# Patient Record
Sex: MALE | Race: BLACK OR AFRICAN AMERICAN | Employment: FULL TIME | URBAN - METROPOLITAN AREA
[De-identification: names, ages, dates, MRNs, and addresses within clinical notes are randomized per-mention and may not be internally consistent; named-entity substitution may affect disease eponyms.]

---

## 2020-09-21 ENCOUNTER — APPOINTMENT (OUTPATIENT)
Dept: GENERAL RADIOLOGY | Age: 60
DRG: 177 | End: 2020-09-21
Attending: EMERGENCY MEDICINE
Payer: COMMERCIAL

## 2020-09-21 ENCOUNTER — HOSPITAL ENCOUNTER (INPATIENT)
Age: 60
LOS: 3 days | Discharge: HOME | DRG: 177 | End: 2020-09-24
Attending: EMERGENCY MEDICINE | Admitting: HOSPITALIST
Payer: COMMERCIAL

## 2020-09-21 DIAGNOSIS — U07.1 COVID-19: ICD-10-CM

## 2020-09-21 DIAGNOSIS — E11.9 CONTROLLED TYPE 2 DIABETES MELLITUS WITHOUT COMPLICATION, WITHOUT LONG-TERM CURRENT USE OF INSULIN (HCC): ICD-10-CM

## 2020-09-21 DIAGNOSIS — R09.02 HYPOXIA: Primary | ICD-10-CM

## 2020-09-21 DIAGNOSIS — U07.1 COVID-19 VIRUS INFECTION: ICD-10-CM

## 2020-09-21 DIAGNOSIS — J96.01 ACUTE RESPIRATORY FAILURE WITH HYPOXIA (HCC): ICD-10-CM

## 2020-09-21 DIAGNOSIS — I10 ESSENTIAL HYPERTENSION: ICD-10-CM

## 2020-09-21 LAB
ALBUMIN SERPL-MCNC: 2.5 G/DL (ref 3.5–5)
ALBUMIN/GLOB SERPL: 0.4 {RATIO} (ref 1.1–2.2)
ALP SERPL-CCNC: 71 U/L (ref 45–117)
ALT SERPL-CCNC: 25 U/L (ref 12–78)
ANION GAP SERPL CALC-SCNC: 6 MMOL/L (ref 5–15)
APPEARANCE UR: CLEAR
APTT PPP: 25.7 SEC (ref 22.1–32)
AST SERPL-CCNC: 25 U/L (ref 15–37)
ATRIAL RATE: 91 BPM
BACTERIA URNS QL MICRO: NEGATIVE /HPF
BASOPHILS # BLD: 0.1 K/UL (ref 0–0.1)
BASOPHILS NFR BLD: 0 % (ref 0–1)
BILIRUB SERPL-MCNC: 0.4 MG/DL (ref 0.2–1)
BILIRUB UR QL: NEGATIVE
BNP SERPL-MCNC: 46 PG/ML
BUN SERPL-MCNC: 14 MG/DL (ref 6–20)
BUN/CREAT SERPL: 13 (ref 12–20)
CALCIUM SERPL-MCNC: 8.9 MG/DL (ref 8.5–10.1)
CALCULATED P AXIS, ECG09: 41 DEGREES
CALCULATED R AXIS, ECG10: -7 DEGREES
CALCULATED T AXIS, ECG11: 27 DEGREES
CHLORIDE SERPL-SCNC: 100 MMOL/L (ref 97–108)
CO2 SERPL-SCNC: 30 MMOL/L (ref 21–32)
COLOR UR: ABNORMAL
COMMENT, HOLDF: NORMAL
COVID-19 RAPID TEST, COVR: DETECTED
CREAT SERPL-MCNC: 1.06 MG/DL (ref 0.7–1.3)
CRP SERPL-MCNC: 5.4 MG/DL (ref 0–0.6)
D DIMER PPP FEU-MCNC: 9.78 MG/L FEU (ref 0–0.65)
DIAGNOSIS, 93000: NORMAL
DIFFERENTIAL METHOD BLD: ABNORMAL
EOSINOPHIL # BLD: 0.1 K/UL (ref 0–0.4)
EOSINOPHIL NFR BLD: 1 % (ref 0–7)
EPITH CASTS URNS QL MICRO: ABNORMAL /LPF
ERYTHROCYTE [DISTWIDTH] IN BLOOD BY AUTOMATED COUNT: 16.5 % (ref 11.5–14.5)
EST. AVERAGE GLUCOSE BLD GHB EST-MCNC: 295 MG/DL
FIBRINOGEN PPP-MCNC: 708 MG/DL (ref 200–475)
GLOBULIN SER CALC-MCNC: 6.1 G/DL (ref 2–4)
GLUCOSE BLD STRIP.AUTO-MCNC: 222 MG/DL (ref 65–100)
GLUCOSE BLD STRIP.AUTO-MCNC: 372 MG/DL (ref 65–100)
GLUCOSE SERPL-MCNC: 324 MG/DL (ref 65–100)
GLUCOSE UR STRIP.AUTO-MCNC: >1000 MG/DL
HBA1C MFR BLD: 11.9 % (ref 4–5.6)
HCT VFR BLD AUTO: 41.7 % (ref 36.6–50.3)
HEALTH STATUS, XMCV2T: ABNORMAL
HGB BLD-MCNC: 13.3 G/DL (ref 12.1–17)
HGB UR QL STRIP: NEGATIVE
HYALINE CASTS URNS QL MICRO: ABNORMAL /LPF (ref 0–5)
IMM GRANULOCYTES # BLD AUTO: 0.1 K/UL (ref 0–0.04)
IMM GRANULOCYTES NFR BLD AUTO: 1 % (ref 0–0.5)
INR PPP: 1.1 (ref 0.9–1.1)
KETONES UR QL STRIP.AUTO: NEGATIVE MG/DL
LACTATE SERPL-SCNC: 1.4 MMOL/L (ref 0.4–2)
LDH SERPL L TO P-CCNC: 394 U/L (ref 85–241)
LEUKOCYTE ESTERASE UR QL STRIP.AUTO: NEGATIVE
LYMPHOCYTES # BLD: 1.7 K/UL (ref 0.8–3.5)
LYMPHOCYTES NFR BLD: 16 % (ref 12–49)
MCH RBC QN AUTO: 23.6 PG (ref 26–34)
MCHC RBC AUTO-ENTMCNC: 31.9 G/DL (ref 30–36.5)
MCV RBC AUTO: 73.9 FL (ref 80–99)
MONOCYTES # BLD: 1.6 K/UL (ref 0–1)
MONOCYTES NFR BLD: 15 % (ref 5–13)
NEUTS SEG # BLD: 7.6 K/UL (ref 1.8–8)
NEUTS SEG NFR BLD: 67 % (ref 32–75)
NITRITE UR QL STRIP.AUTO: NEGATIVE
NRBC # BLD: 0 K/UL (ref 0–0.01)
NRBC BLD-RTO: 0 PER 100 WBC
P-R INTERVAL, ECG05: 144 MS
PH UR STRIP: 5.5 [PH] (ref 5–8)
PLATELET # BLD AUTO: 504 K/UL (ref 150–400)
PMV BLD AUTO: 9.8 FL (ref 8.9–12.9)
POTASSIUM SERPL-SCNC: 3.3 MMOL/L (ref 3.5–5.1)
PROCALCITONIN SERPL-MCNC: <0.05 NG/ML
PROT SERPL-MCNC: 8.6 G/DL (ref 6.4–8.2)
PROT UR STRIP-MCNC: ABNORMAL MG/DL
PROTHROMBIN TIME: 11.4 SEC (ref 9–11.1)
Q-T INTERVAL, ECG07: 368 MS
QRS DURATION, ECG06: 94 MS
QTC CALCULATION (BEZET), ECG08: 452 MS
RBC # BLD AUTO: 5.64 M/UL (ref 4.1–5.7)
RBC #/AREA URNS HPF: ABNORMAL /HPF (ref 0–5)
SAMPLES BEING HELD,HOLD: NORMAL
SERVICE CMNT-IMP: ABNORMAL
SERVICE CMNT-IMP: ABNORMAL
SODIUM SERPL-SCNC: 136 MMOL/L (ref 136–145)
SOURCE, COVRS: ABNORMAL
SP GR UR REFRACTOMETRY: 1.02 (ref 1–1.03)
SPECIMEN SOURCE, FCOV2M: ABNORMAL
SPECIMEN TYPE, XMCV1T: ABNORMAL
THERAPEUTIC RANGE,PTTT: NORMAL SECS (ref 58–77)
TROPONIN I SERPL-MCNC: <0.05 NG/ML
UA: UC IF INDICATED,UAUC: ABNORMAL
UROBILINOGEN UR QL STRIP.AUTO: 0.2 EU/DL (ref 0.2–1)
VENTRICULAR RATE, ECG03: 91 BPM
WBC # BLD AUTO: 11.1 K/UL (ref 4.1–11.1)
WBC URNS QL MICRO: ABNORMAL /HPF (ref 0–4)

## 2020-09-21 PROCEDURE — 84145 PROCALCITONIN (PCT): CPT

## 2020-09-21 PROCEDURE — 93005 ELECTROCARDIOGRAM TRACING: CPT

## 2020-09-21 PROCEDURE — 99222 1ST HOSP IP/OBS MODERATE 55: CPT | Performed by: INTERNAL MEDICINE

## 2020-09-21 PROCEDURE — 84484 ASSAY OF TROPONIN QUANT: CPT

## 2020-09-21 PROCEDURE — 83880 ASSAY OF NATRIURETIC PEPTIDE: CPT

## 2020-09-21 PROCEDURE — 87040 BLOOD CULTURE FOR BACTERIA: CPT

## 2020-09-21 PROCEDURE — 74011250636 HC RX REV CODE- 250/636: Performed by: EMERGENCY MEDICINE

## 2020-09-21 PROCEDURE — 71045 X-RAY EXAM CHEST 1 VIEW: CPT

## 2020-09-21 PROCEDURE — 86140 C-REACTIVE PROTEIN: CPT

## 2020-09-21 PROCEDURE — 85025 COMPLETE CBC W/AUTO DIFF WBC: CPT

## 2020-09-21 PROCEDURE — 74011250636 HC RX REV CODE- 250/636: Performed by: HOSPITALIST

## 2020-09-21 PROCEDURE — 99285 EMERGENCY DEPT VISIT HI MDM: CPT

## 2020-09-21 PROCEDURE — 87635 SARS-COV-2 COVID-19 AMP PRB: CPT

## 2020-09-21 PROCEDURE — 83605 ASSAY OF LACTIC ACID: CPT

## 2020-09-21 PROCEDURE — 80053 COMPREHEN METABOLIC PANEL: CPT

## 2020-09-21 PROCEDURE — 65270000029 HC RM PRIVATE

## 2020-09-21 PROCEDURE — 74011636637 HC RX REV CODE- 636/637: Performed by: HOSPITALIST

## 2020-09-21 PROCEDURE — 81001 URINALYSIS AUTO W/SCOPE: CPT

## 2020-09-21 PROCEDURE — 85379 FIBRIN DEGRADATION QUANT: CPT

## 2020-09-21 PROCEDURE — 85610 PROTHROMBIN TIME: CPT

## 2020-09-21 PROCEDURE — 82962 GLUCOSE BLOOD TEST: CPT

## 2020-09-21 PROCEDURE — 83615 LACTATE (LD) (LDH) ENZYME: CPT

## 2020-09-21 PROCEDURE — 85384 FIBRINOGEN ACTIVITY: CPT

## 2020-09-21 PROCEDURE — 74011000258 HC RX REV CODE- 258: Performed by: HOSPITALIST

## 2020-09-21 PROCEDURE — 85730 THROMBOPLASTIN TIME PARTIAL: CPT

## 2020-09-21 PROCEDURE — 83036 HEMOGLOBIN GLYCOSYLATED A1C: CPT

## 2020-09-21 PROCEDURE — 74011250637 HC RX REV CODE- 250/637: Performed by: HOSPITALIST

## 2020-09-21 PROCEDURE — 36415 COLL VENOUS BLD VENIPUNCTURE: CPT

## 2020-09-21 RX ORDER — ONDANSETRON 2 MG/ML
4 INJECTION INTRAMUSCULAR; INTRAVENOUS
Status: DISCONTINUED | OUTPATIENT
Start: 2020-09-21 | End: 2020-09-24 | Stop reason: HOSPADM

## 2020-09-21 RX ORDER — SODIUM CHLORIDE 0.9 % (FLUSH) 0.9 %
5-40 SYRINGE (ML) INJECTION AS NEEDED
Status: DISCONTINUED | OUTPATIENT
Start: 2020-09-21 | End: 2020-09-24 | Stop reason: HOSPADM

## 2020-09-21 RX ORDER — PROMETHAZINE HYDROCHLORIDE 25 MG/1
12.5 TABLET ORAL
Status: DISCONTINUED | OUTPATIENT
Start: 2020-09-21 | End: 2020-09-24 | Stop reason: HOSPADM

## 2020-09-21 RX ORDER — ACETAMINOPHEN 325 MG/1
650 TABLET ORAL
Status: DISCONTINUED | OUTPATIENT
Start: 2020-09-21 | End: 2020-09-24 | Stop reason: HOSPADM

## 2020-09-21 RX ORDER — SODIUM CHLORIDE 0.9 % (FLUSH) 0.9 %
5-40 SYRINGE (ML) INJECTION EVERY 8 HOURS
Status: DISCONTINUED | OUTPATIENT
Start: 2020-09-21 | End: 2020-09-24 | Stop reason: HOSPADM

## 2020-09-21 RX ORDER — POTASSIUM CHLORIDE 750 MG/1
40 TABLET, FILM COATED, EXTENDED RELEASE ORAL
Status: COMPLETED | OUTPATIENT
Start: 2020-09-21 | End: 2020-09-21

## 2020-09-21 RX ORDER — ENOXAPARIN SODIUM 100 MG/ML
40 INJECTION SUBCUTANEOUS DAILY
Status: DISCONTINUED | OUTPATIENT
Start: 2020-09-22 | End: 2020-09-23

## 2020-09-21 RX ORDER — INSULIN LISPRO 100 [IU]/ML
INJECTION, SOLUTION INTRAVENOUS; SUBCUTANEOUS
Status: DISCONTINUED | OUTPATIENT
Start: 2020-09-21 | End: 2020-09-24 | Stop reason: HOSPADM

## 2020-09-21 RX ORDER — ALBUTEROL SULFATE 90 UG/1
2 AEROSOL, METERED RESPIRATORY (INHALATION)
Status: DISCONTINUED | OUTPATIENT
Start: 2020-09-21 | End: 2020-09-24 | Stop reason: HOSPADM

## 2020-09-21 RX ORDER — INSULIN LISPRO 100 [IU]/ML
INJECTION, SOLUTION INTRAVENOUS; SUBCUTANEOUS
Status: DISCONTINUED | OUTPATIENT
Start: 2020-09-21 | End: 2020-09-21

## 2020-09-21 RX ORDER — INSULIN LISPRO 100 [IU]/ML
10 INJECTION, SOLUTION INTRAVENOUS; SUBCUTANEOUS ONCE
Status: COMPLETED | OUTPATIENT
Start: 2020-09-21 | End: 2020-09-21

## 2020-09-21 RX ORDER — ATORVASTATIN CALCIUM 10 MG/1
10 TABLET, FILM COATED ORAL DAILY
COMMUNITY
End: 2020-10-01 | Stop reason: SDUPTHER

## 2020-09-21 RX ORDER — GLIPIZIDE 5 MG/1
5 TABLET, FILM COATED, EXTENDED RELEASE ORAL DAILY
COMMUNITY
End: 2020-10-01 | Stop reason: DRUGHIGH

## 2020-09-21 RX ORDER — AMLODIPINE BESYLATE 10 MG/1
10 TABLET ORAL DAILY
COMMUNITY
End: 2020-09-27

## 2020-09-21 RX ORDER — POLYETHYLENE GLYCOL 3350 17 G/17G
17 POWDER, FOR SOLUTION ORAL DAILY PRN
Status: DISCONTINUED | OUTPATIENT
Start: 2020-09-21 | End: 2020-09-24 | Stop reason: HOSPADM

## 2020-09-21 RX ORDER — ZINC SULFATE 50(220)MG
1 CAPSULE ORAL EVERY 12 HOURS
Status: DISCONTINUED | OUTPATIENT
Start: 2020-09-21 | End: 2020-09-24 | Stop reason: HOSPADM

## 2020-09-21 RX ORDER — DEXTROSE 50 % IN WATER (D50W) INTRAVENOUS SYRINGE
12.5-25 AS NEEDED
Status: DISCONTINUED | OUTPATIENT
Start: 2020-09-21 | End: 2020-09-24 | Stop reason: HOSPADM

## 2020-09-21 RX ORDER — MAGNESIUM SULFATE 100 %
4 CRYSTALS MISCELLANEOUS AS NEEDED
Status: DISCONTINUED | OUTPATIENT
Start: 2020-09-21 | End: 2020-09-24 | Stop reason: HOSPADM

## 2020-09-21 RX ORDER — DEXAMETHASONE SODIUM PHOSPHATE 4 MG/ML
6 INJECTION, SOLUTION INTRA-ARTICULAR; INTRALESIONAL; INTRAMUSCULAR; INTRAVENOUS; SOFT TISSUE DAILY
Status: DISCONTINUED | OUTPATIENT
Start: 2020-09-22 | End: 2020-09-24 | Stop reason: HOSPADM

## 2020-09-21 RX ORDER — DEXAMETHASONE SODIUM PHOSPHATE 4 MG/ML
10 INJECTION, SOLUTION INTRA-ARTICULAR; INTRALESIONAL; INTRAMUSCULAR; INTRAVENOUS; SOFT TISSUE ONCE
Status: COMPLETED | OUTPATIENT
Start: 2020-09-21 | End: 2020-09-21

## 2020-09-21 RX ORDER — VALSARTAN AND HYDROCHLOROTHIAZIDE 320; 25 MG/1; MG/1
1 TABLET, FILM COATED ORAL DAILY
COMMUNITY
End: 2020-10-01 | Stop reason: SDUPTHER

## 2020-09-21 RX ORDER — ASCORBIC ACID 500 MG
500 TABLET ORAL 2 TIMES DAILY
Status: DISCONTINUED | OUTPATIENT
Start: 2020-09-21 | End: 2020-09-24 | Stop reason: HOSPADM

## 2020-09-21 RX ORDER — METFORMIN HYDROCHLORIDE 1000 MG/1
1000 TABLET ORAL 2 TIMES DAILY WITH MEALS
COMMUNITY
End: 2020-10-01 | Stop reason: SDUPTHER

## 2020-09-21 RX ADMIN — INSULIN LISPRO 2 UNITS: 100 INJECTION, SOLUTION INTRAVENOUS; SUBCUTANEOUS at 23:48

## 2020-09-21 RX ADMIN — Medication 10 ML: at 15:01

## 2020-09-21 RX ADMIN — ZINC SULFATE 220 MG (50 MG) CAPSULE 1 CAPSULE: CAPSULE at 14:59

## 2020-09-21 RX ADMIN — CEFTRIAXONE 1 G: 1 INJECTION, POWDER, FOR SOLUTION INTRAMUSCULAR; INTRAVENOUS at 20:47

## 2020-09-21 RX ADMIN — OXYCODONE HYDROCHLORIDE AND ACETAMINOPHEN 500 MG: 500 TABLET ORAL at 19:39

## 2020-09-21 RX ADMIN — AZITHROMYCIN MONOHYDRATE 500 MG: 500 INJECTION, POWDER, LYOPHILIZED, FOR SOLUTION INTRAVENOUS at 21:22

## 2020-09-21 RX ADMIN — DEXAMETHASONE SODIUM PHOSPHATE 10 MG: 4 INJECTION, SOLUTION INTRAMUSCULAR; INTRAVENOUS at 13:10

## 2020-09-21 RX ADMIN — INSULIN LISPRO 10 UNITS: 100 INJECTION, SOLUTION INTRAVENOUS; SUBCUTANEOUS at 19:40

## 2020-09-21 RX ADMIN — HUMAN INSULIN 15 UNITS: 100 INJECTION, SUSPENSION SUBCUTANEOUS at 19:39

## 2020-09-21 RX ADMIN — POTASSIUM CHLORIDE 40 MEQ: 750 TABLET, FILM COATED, EXTENDED RELEASE ORAL at 20:45

## 2020-09-21 RX ADMIN — ZINC SULFATE 220 MG (50 MG) CAPSULE 1 CAPSULE: CAPSULE at 20:45

## 2020-09-21 NOTE — ED NOTES
Pt sister Christen Dean called to ED. Pt verified okay to give updates and POC, positive status. Pt sister number : [phone]. She is out of state in [place].

## 2020-09-21 NOTE — ED PROVIDER NOTES
EMERGENCY DEPARTMENT HISTORY AND PHYSICAL EXAM      Date: 9/21/2020  Patient Name: Lindalou Severance    History of Presenting Illness     Chief Complaint   Patient presents with    Fatigue     since last week \"I just haven't been feeling right\". denies fever. endorses cough and cold sx and lack of appetite.  Shortness of Breath     pt is hypoxic in triage at 82%       History Provided By: Patient    HPI: Lindalou Severance, 64 y.o. male presents to the ED with cc of hypoxia. Patient reports symptoms began 1 week ago on Tuesday. Reports went to work where he works as a , possible covert exposures. Reports since then he has been fatigued, and not been feeling right. Denies measured fevers. Endorses decreased appetite and some diarrhea. Endorses some cough as well. No headache, no myalgias, no anosmia or dysgeusia. Patient reports his family urged him to come to the emergency department today. At triage she was noted to be 80% on room air. He states he does have a history of hypertension and hyperglycemia, but has been out of his medicines for months. There are no other complaints, changes, or physical findings at this time. PCP: Unknown, Provider    No current facility-administered medications on file prior to encounter. Current Outpatient Medications on File Prior to Encounter   Medication Sig Dispense Refill    metFORMIN (GLUCOPHAGE) 1,000 mg tablet Take 1,000 mg by mouth two (2) times daily (with meals).  amLODIPine (NORVASC) 10 mg tablet Take 10 mg by mouth daily.  atorvastatin (LIPITOR) 10 mg tablet Take 10 mg by mouth daily.  glipiZIDE SR (GLUCOTROL XL) 5 mg CR tablet Take 5 mg by mouth daily.  valsartan-hydroCHLOROthiazide (DIOVAN-HCT) 320-25 mg per tablet Take 1 Tab by mouth daily.          Past History     Past Medical History:  Past Medical History:   Diagnosis Date    Diabetes ([facility] [address]     Hyperlipidemia     borderline    Hypertension Past Surgical History:  History reviewed. No pertinent surgical history. Family History:  Family History   Problem Relation Age of Onset    Hypertension Mother     No Known Problems Father        Social History:  Social History     Tobacco Use    Smoking status: Current Some Day Smoker    Smokeless tobacco: Never Used    Tobacco comment: 1 cigar a year   Substance Use Topics    Alcohol use: Yes     Frequency: 2-3 times a week     Comment: drink only on weekends 2-3 drinks    Drug use: Never       Allergies:  No Known Allergies      Review of Systems   Review of Systems   Constitutional: Positive for fatigue. Negative for fever. HENT: Negative for voice change. Eyes: Negative for pain and redness. Respiratory: Positive for shortness of breath. Negative for cough and chest tightness. Cardiovascular: Negative for chest pain and leg swelling. Gastrointestinal: Positive for diarrhea. Negative for abdominal pain, nausea and vomiting. Genitourinary: Negative for hematuria. Musculoskeletal: Negative for gait problem. Skin: Negative for color change, pallor and rash. Neurological: Negative for facial asymmetry, weakness and headaches. Hematological: Does not bruise/bleed easily. Psychiatric/Behavioral: Negative for behavioral problems. All other systems reviewed and are negative. Physical Exam   Physical Exam  Vitals signs and nursing note reviewed. Constitutional:       Comments: 53-year-old male, resting in bed, appears nontoxic   HENT:      Head: Normocephalic. Right Ear: External ear normal.      Left Ear: External ear normal.      Nose: Nose normal.   Eyes:      Conjunctiva/sclera: Conjunctivae normal.   Cardiovascular:      Rate and Rhythm: Normal rate and regular rhythm. Heart sounds: No murmur. No friction rub. No gallop. Pulmonary:      Effort: Pulmonary effort is normal.      Breath sounds: Normal breath sounds. No wheezing, rhonchi or rales. Comments: Saturating 92% on nasal cannula as interpreted by me  Abdominal:      Palpations: Abdomen is soft. Tenderness: There is no abdominal tenderness. Musculoskeletal: Normal range of motion. Skin:     General: Skin is warm. Capillary Refill: Capillary refill takes less than 2 seconds. Neurological:      Mental Status: He is alert. Mental status is at baseline. Psychiatric:         Mood and Affect: Mood normal.         Behavior: Behavior normal.         Diagnostic Study Results     Labs -     Recent Results (from the past 12 hour(s))   EKG, 12 LEAD, INITIAL    Collection Time: 09/21/20 11:23 AM   Result Value Ref Range    Ventricular Rate 91 BPM    Atrial Rate 91 BPM    P-R Interval 144 ms    QRS Duration 94 ms    Q-T Interval 368 ms    QTC Calculation (Bezet) 452 ms    Calculated P Axis 41 degrees    Calculated R Axis -7 degrees    Calculated T Axis 27 degrees    Diagnosis       Normal sinus rhythm  Minimal voltage criteria for LVH, may be normal variant  No previous ECGs available  Confirmed by Murphy Pruett ([id number]) on 9/21/2020 1:24:27 PM     CBC WITH AUTOMATED DIFF    Collection Time: 09/21/20 11:34 AM   Result Value Ref Range    WBC 11.1 4.1 - 11.1 K/uL    RBC 5.64 4.10 - 5.70 M/uL    HGB 13.3 12.1 - 17.0 g/dL    HCT 41.7 36.6 - 50.3 %    MCV 73.9 (L) 80.0 - 99.0 FL    MCH 23.6 (L) 26.0 - 34.0 PG    MCHC 31.9 30.0 - 36.5 g/dL    RDW 16.5 (H) 11.5 - 14.5 %    PLATELET 282 (H) 350 - 400 K/uL    MPV 9.8 8.9 - 12.9 FL    NRBC 0.0 0  WBC    ABSOLUTE NRBC 0.00 0.00 - 0.01 K/uL    NEUTROPHILS 67 32 - 75 %    LYMPHOCYTES 16 12 - 49 %    MONOCYTES 15 (H) 5 - 13 %    EOSINOPHILS 1 0 - 7 %    BASOPHILS 0 0 - 1 %    IMMATURE GRANULOCYTES 1 (H) 0.0 - 0.5 %    ABS. NEUTROPHILS 7.6 1.8 - 8.0 K/UL    ABS. LYMPHOCYTES 1.7 0.8 - 3.5 K/UL    ABS. MONOCYTES 1.6 (H) 0.0 - 1.0 K/UL    ABS. EOSINOPHILS 0.1 0.0 - 0.4 K/UL    ABS. BASOPHILS 0.1 0.0 - 0.1 K/UL    ABS. IMM.  GRANS. 0.1 (H) 0.00 - 0.04 K/UL DF AUTOMATED     METABOLIC PANEL, COMPREHENSIVE    Collection Time: 09/21/20 11:34 AM   Result Value Ref Range    Sodium 136 136 - 145 mmol/L    Potassium 3.3 (L) 3.5 - 5.1 mmol/L    Chloride 100 97 - 108 mmol/L    CO2 30 21 - 32 mmol/L    Anion gap 6 5 - 15 mmol/L    Glucose 324 (H) 65 - 100 mg/dL    BUN 14 6 - 20 MG/DL    Creatinine 1.06 0.70 - 1.30 MG/DL    BUN/Creatinine ratio 13 12 - 20      GFR est AA >60 >60 ml/min/1.73m2    GFR est non-AA >60 >60 ml/min/1.73m2    Calcium 8.9 8.5 - 10.1 MG/DL    Bilirubin, total 0.4 0.2 - 1.0 MG/DL    ALT (SGPT) 25 12 - 78 U/L    AST (SGOT) 25 15 - 37 U/L    Alk. phosphatase 71 45 - 117 U/L    Protein, total 8.6 (H) 6.4 - 8.2 g/dL    Albumin 2.5 (L) 3.5 - 5.0 g/dL    Globulin 6.1 (H) 2.0 - 4.0 g/dL    A-G Ratio 0.4 (L) 1.1 - 2.2     TROPONIN I    Collection Time: 09/21/20 11:34 AM   Result Value Ref Range    Troponin-I, Qt. <0.05 <0.05 ng/mL   NT-PRO BNP    Collection Time: 09/21/20 11:34 AM   Result Value Ref Range    NT pro-BNP 46 <125 PG/ML   SAMPLES BEING HELD    Collection Time: 09/21/20 11:34 AM   Result Value Ref Range    SAMPLES BEING HELD  SST, BLUE, RD, BLDCS     COMMENT        Add-on orders for these samples will be processed based on acceptable specimen integrity and analyte stability, which may vary by analyte.    SARS-COV-2    Collection Time: 09/21/20 11:49 AM   Result Value Ref Range    Specimen source Nasopharyngeal      Specimen source Nasopharyngeal      COVID-19 rapid test Detected (A) NOTD      Specimen type NP Swab      Health status Symptomatic Testing     LD    Collection Time: 09/21/20  2:45 PM   Result Value Ref Range     (H) 85 - 241 U/L   D DIMER    Collection Time: 09/21/20  2:45 PM   Result Value Ref Range    D-dimer 9.78 (H) 0.00 - 0.65 mg/L FEU   PROCALCITONIN    Collection Time: 09/21/20  2:45 PM   Result Value Ref Range    Procalcitonin <0.05 ng/mL   LACTIC ACID    Collection Time: 09/21/20  2:45 PM   Result Value Ref Range    Lactic acid 1.4 0.4 - 2.0 MMOL/L   PROTHROMBIN TIME + INR    Collection Time: 09/21/20  2:45 PM   Result Value Ref Range    INR 1.1 0.9 - 1.1      Prothrombin time 11.4 (H) 9.0 - 11.1 sec   PTT    Collection Time: 09/21/20  2:45 PM   Result Value Ref Range    aPTT 25.7 22.1 - 32.0 sec    aPTT, therapeutic range     58.0 - 77.0 SECS   FIBRINOGEN    Collection Time: 09/21/20  2:45 PM   Result Value Ref Range    Fibrinogen 708 (H) 200 - 475 mg/dL       Radiologic Studies -   XR CHEST PORT   Final Result   IMPRESSION: Bilateral airspace disease. CT Results  (Last 48 hours)    None        CXR Results  (Last 48 hours)               09/21/20 1152  XR CHEST PORT Final result    Impression:  IMPRESSION: Bilateral airspace disease. Narrative:  EXAM: Portable CXR. 1150 hours         INDICATION: possible COVID       FINDINGS:   There is patchy bilateral airspace disease. Lung volumes are low. Heart size is   normal. There is no pulmonary edema, pneumothorax, midline shift or apparent   pleural effusion. Medical Decision Making   I am the first provider for this patient. I reviewed the vital signs, available nursing notes, past medical history, past surgical history, family history and social history. Vital Signs-Reviewed the patient's vital signs. Patient Vitals for the past 12 hrs:   Temp Pulse Resp BP SpO2   09/21/20 1500  78 22 135/77 95 %   09/21/20 1400  84 20 126/74 93 %   09/21/20 1320     93 %   09/21/20 1300  83 25 130/70 94 %   09/21/20 1200  86 27 121/67 93 %   09/21/20 1139  88 22  94 %   09/21/20 1125  93 18  90 %   09/21/20 1124  93   90 %   09/21/20 1123     (!) 86 %   09/21/20 1122     (!) 81 %   09/21/20 1120    (!) 147/88    09/21/20 1107 98.5 °F (36.9 °C) (!) 102 20 (!) 192/93 (!) 82 %       EKG interpretation: (Preliminary)    Preliminary EKG interpreted by me. Shows sinus rhythm with a HR of 91.   No ST elevations or depressions concerning for ischemia. Normal intervals. Records Reviewed: Nursing Notes    Provider Notes (Medical Decision Making):     70-year-old male presents emergency department with 1 week of fatigue and cough. Hypoxic at triage, improving on nasal cannula. We will monitor for need to escalate oxygen therapy. Differential includes COVID, bacterial pneumonia, CHF. No history of COPD. Doubt PE based on symptoms. Check basic labs chest x-ray. ED Course:   Initial assessment performed. The patients presenting problems have been discussed, and they are in agreement with the care plan formulated and outlined with them. I have encouraged them to ask questions as they arise throughout their visit. ED Course as of Sep 21 1645   Mon Sep 21, 2020   1254 Prior to this timestamp, patient's rapid COVID was positive. I gave the patient 10 mg of IV decadron. Labs are largely unremakable with exception of hyperglycemia. Discussed with Dr. Tramaine Gallegos, will evaluate patient. [MB]      ED Course User Index  [MB] Kaye Meza MD     Critical Care Time:   CRITICAL CARE NOTE :    11:31 AM    IMPENDING DETERIORATION -Respiratory  ASSOCIATED RISK FACTORS - Hypoxia  MANAGEMENT- Bedside Assessment  INTERPRETATION -  Xrays and ECG  INTERVENTIONS - hemodynamic mngmt  CASE REVIEW - Hospitalist  TREATMENT RESPONSE -Improved  PERFORMED BY - Self    NOTES   :  I have spent 25 minutes of critical care time involved in lab review, consultations with specialist, family decision- making, bedside attention and documentation. This time excludes time spent in any separate billed procedures. During this entire length of time I was immediately available to the patient . Katelynn Guerra MD      Disposition:    Admitted    Diagnosis     Clinical Impression:   1. Hypoxia    2. COVID-19    3. Controlled type 2 diabetes mellitus without complication, without long-term current use of insulin ([facility] [address]    4. Essential hypertension    5.  [facility]    6. COVID-19 virus infection        Attestations:    Benjie Piper MD    Please note that this dictation was completed with "[employer] the computer voice recognition software. Quite often unanticipated grammatical, syntax, homophones, and other interpretive errors are inadvertently transcribed by the computer software. Please disregard these errors. Please excuse any errors that have escaped final proofreading. Thank you.

## 2020-09-21 NOTE — PROGRESS NOTES
Pharmacy Medication Reconciliation     The patient was interviewed regarding current PTA medication list, use and drug allergies;  patient was present in room and obtained permission from patient to discuss drug regimen with visitor(s) present. The patient was questioned regarding use of any other inhalers, topical products, over the counter medications, herbal medications, vitamin products or ophthalmic/nasal/otic medication use. Allergy Update: Patient has no known allergies. Recommendations/Findings: The following amendments were made to the patient's active medication list on file at [facility]:   1) Additions: all medications below    2) Deletions: none    3) Changes: none      Pertinent Findings:   -patient hasn't had any of the medications listed below due to the pandemic and him not being able to pick them up at the pharmacy.     -Clarified PTA med list with patient interview, Rx query. PTA medication list was corrected to the following:     Prior to Admission Medications   Prescriptions Last Dose Informant Taking? amLODIPine (NORVASC) 10 mg tablet 1.5 months ago Self No   Sig: Take 10 mg by mouth daily. atorvastatin (LIPITOR) 10 mg tablet 1.5 months ago Self No   Sig: Take 10 mg by mouth daily. glipiZIDE SR (GLUCOTROL XL) 5 mg CR tablet 1.5 months ago Self No   Sig: Take 5 mg by mouth daily. metFORMIN (GLUCOPHAGE) 1,000 mg tablet  Self Yes   Sig: Take 1,000 mg by mouth two (2) times daily (with meals). valsartan-hydroCHLOROthiazide (DIOVAN-HCT) 320-25 mg per tablet 1.5 months ago Self No   Sig: Take 1 Tab by mouth daily.       Facility-Administered Medications: None          Thank you,  CANDICE Madrid

## 2020-09-21 NOTE — CONSULTS
Infectious Disease Consult  Alvis Dakin MD FAC    Date of Consultation:  September 21, 2020  Date of Admission: 9/21/2020   Referring Physician: Dr Luana Ruiz:     Patient is a 64 y.o. male who is being seen for  Covid pneumonia , consideration for Remdesivir. IMPRESSION:   · Covid disease  · SARS- Cov2 + on 9/21  · Acute hypoxic respiratory failure 93% on 3 L oxygen  · CXRpending  · Acute phase reactants- pending  · Diabetes type 2.  · Hypertension. · Obesity BMI 31.92       PLAN:    Remdesivir   · Patient meets criteria for IVRemdesivir . Discussed with patient clinical trial outcomes, risks, benefits. All questions answered. Patient declines Remdesivir at this time  · Monitor sats, acute phase reactants  · Vitamin C, Decadron, zinc.  If infiltrates positive start ceftriaxone and azithromycin IV  · Please call patient would like to start on Remdesivir  thank you     Steven Fuentes is a 64 yr old male who presented with SOB, fatigue . Sabrina Hernandez Pt  Reported  symptoms began 1 week ago on Tuesday. Reports went to work where he works as a , possible covid  exposures. Reports since then he has been fatigued, and not been feeling right. Denid fevers. Endorses decreased appetite and some diarrhea. Endorses some cough as well. No headache, no myalgias, no anosmia or dysgeusia.   Patient states  family urged him to come to the emergency department today. At triage he was noted to be 80% on room air. He stated he does have a history of hypertension and DM  but has been out of his medicines for months.  CXR pending at this time. On speaking to pt he denies fever . Cough & SOB +   D/w pt riska, benefits of Remdesivir IV . Pt questions answered . Declines Remdesivir at this time  Dr. Sherry Byers notified.     Patient Active Problem List   Diagnosis Code    Acute respiratory failure with hypoxia (HCC) J96.01    COVID-19 virus infection U07.1     Past Medical History:   Diagnosis Date  Diabetes ([facility] [address]     Hyperlipidemia     borderline    Hypertension       Family History   Problem Relation Age of Onset    Hypertension Mother     No Known Problems Father       Social History     Tobacco Use    Smoking status: Current Some Day Smoker    Smokeless tobacco: Never Used    Tobacco comment: 1 cigar a year   Substance Use Topics    Alcohol use: Yes     Frequency: 2-3 times a week     Comment: drink only on weekends 2-3 drinks     History reviewed. No pertinent surgical history. Prior to Admission medications    Medication Sig Start Date End Date Taking? Authorizing Provider   metFORMIN (GLUCOPHAGE) 1,000 mg tablet Take 1,000 mg by mouth two (2) times daily (with meals). Yes Provider, Historical   amLODIPine (NORVASC) 10 mg tablet Take 10 mg by mouth daily. Provider, Historical   atorvastatin (LIPITOR) 10 mg tablet Take 10 mg by mouth daily. Provider, Historical   glipiZIDE SR (GLUCOTROL XL) 5 mg CR tablet Take 5 mg by mouth daily. Provider, Historical   valsartan-hydroCHLOROthiazide (DIOVAN-HCT) 320-25 mg per tablet Take 1 Tab by mouth daily. Provider, Historical     No Known Allergies     Review of Systems:  A comprehensive review of systems was negative except for that written in the History of Present Illness. 10 point review of systems obtained . All other systems negative    Objective:   Blood pressure 130/70, pulse 83, temperature 98.5 °F (36.9 °C), resp. rate 25, height 5' 10\" (1.778 m), weight 222 lb 7.1 oz (100.9 kg), SpO2 93 %.   Temp (24hrs), Av.5 °F (36.9 °C), Min:98.5 °F (36.9 °C), Max:98.5 °F (36.9 °C)    Current Facility-Administered Medications   Medication Dose Route Frequency    acetaminophen (TYLENOL) tablet 650 mg  650 mg Oral Q6H PRN    [START ON 2020] dexamethasone (DECADRON) 4 mg/mL injection 6 mg  6 mg IntraVENous DAILY    zinc sulfate (ZINCATE) 220 (50) mg capsule 1 Cap  1 Cap Oral Q12H    ascorbic acid (vitamin C) (VITAMIN C) tablet 500 mg 500 mg Oral BID    insulin lispro (HUMALOG) injection   SubCUTAneous AC&HS    glucose chewable tablet 16 g  4 Tab Oral PRN    dextrose (D50W) injection syrg 12.5-25 g  12.5-25 g IntraVENous PRN    glucagon (GLUCAGEN) injection 1 mg  1 mg IntraMUSCular PRN     Current Outpatient Medications   Medication Sig    metFORMIN (GLUCOPHAGE) 1,000 mg tablet Take 1,000 mg by mouth two (2) times daily (with meals).  amLODIPine (NORVASC) 10 mg tablet Take 10 mg by mouth daily.  atorvastatin (LIPITOR) 10 mg tablet Take 10 mg by mouth daily.  glipiZIDE SR (GLUCOTROL XL) 5 mg CR tablet Take 5 mg by mouth daily.  valsartan-hydroCHLOROthiazide (DIOVAN-HCT) 320-25 mg per tablet Take 1 Tab by mouth daily. Exam:    21/20 13:20:08              93 %  Nasal cannula  3 l/min          09/21/20 1300    83  130/70      25  94 %              09/21/20 1200    86  121/67      27  93 %              09/21/20 1139    88        22  94 %              09/21/20 1125    93        18  90 %              09/21/20 1124    93          90 %              09/21/20 1123              86 %Abnormal                09/21/20 1122              81 %Abnormal              Data Reviewed:   CBC:   Recent Labs     09/21/20  1134   WBC 11.1   RBC 5.64   HGB 13.3   HCT 41.7   *   GRANS 67   LYMPH 16   EOS 1     CMP:   Recent Labs     09/21/20  1134   *      K 3.3*      CO2 30   BUN 14   CREA 1.06   CA 8.9   AGAP 6   BUCR 13   AP 71   TP 8.6*   ALB 2.5*   GLOB 6.1*   AGRAT 0.4*       No results found for: CULT       XR Results (most recent):  No results found for this or any previous visit. [unfilled]        Antibiotic History    I have discussed the diagnosis with the patient and the intended plan as seen in the above orders. I have discussed medication side effects and warnings with the patient as well.     Reviewed test results at length with patient    Signed By: Kyra Diez MD     September 21, 2020

## 2020-09-21 NOTE — H&P
Hospitalist Admission Note    NAME: Herberth Holman   :  1960   MRN:  [id number]     Date/Time:  2020 1:06 PM    Patient PCP: Unknown, Provider    Please note that this dictation was completed with DueDil, the computer voice recognition software. Quite often unanticipated grammatical, syntax, homophones, and other interpretive errors are inadvertently transcribed by the computer software. Please disregard these errors. Please excuse any errors that have escaped final proofreading  ______________________________________________________________________   Assessment & Plan:  Acute hypoxic respiratory failure, POA due to  COVID-19 infection, POA likely pneumonia. CXR pending  --82% RA, requiring 3L for sats 93-94%. Bibasilar crackles on exam  --rapid covid test positive. --get blood cultures, lactic, coags, procalcitonin, LDH, fibrinogen, crp, ddimer  --discussed remdesivir with patient and information sheet given. ID consult, discussed with Dr. Monisha Pena  --dexamethason 6mg IV daily x 10 days  --zinc, vitamin C bid x 5 days  --Start Rocephin and azithromycin    DM type 2 with hyperglycemia   --patient admits to noncompliance with meds  --check A1c.  --put on moderate dose SSI. --put on nph 10 units bid    HTN  Hyperlipidemia  Obesity    Asked pharmacy to reconcile meds    Body mass index is 31.92 kg/m². Code: full  DVT prophylaxis: lovenox  Surrogate decision maker: Mother  Jean-Pierre Aponte [phone] or cousin Latonia Dennis        Subjective:   CHIEF COMPLAINT:  Fatigue, cough, malaise    HISTORY OF PRESENT ILLNESS:     Herberth Holman is a 64 y.o. male with PMH HTN, diabetes, hyperlipidemia with 1 week history of loss of appetite, no fever, chills. +cough shortness of breath, gen weakness, diarrhea x1, loss of taste. Family made him come to ER today. Rapid COVID test positive. Denies any chest pain, abdominal pain nausea vomiting or dysuria.   O2 sat 82% on room air. Has been noncompliant with his medication for a month and a half. We were asked to admit for work up and evaluation of the above problems. Past Medical History:   Diagnosis Date    Diabetes ([address]     Hyperlipidemia     borderline    Hypertension       History reviewed. No pertinent surgical history. Social History     Tobacco Use    Smoking status: Current Some Day Smoker    Smokeless tobacco: Never Used    Tobacco comment: 1 cigar a year   Substance Use Topics    Alcohol use: Yes     Frequency: 2-3 times a week     Comment: drink only on weekends 2-3 drinks      Drug use: Denies  Lives alone, machinery maintenance    Family History   Problem Relation Age of Onset    Hypertension Mother     No Known Problems Father      No Known Allergies     Prior to Admission medications    Medication Sig Start Date End Date Taking? Authorizing Provider   metFORMIN (GLUCOPHAGE) 1,000 mg tablet Take 1,000 mg by mouth two (2) times daily (with meals). Yes Provider, Historical   amLODIPine (NORVASC) 10 mg tablet Take 10 mg by mouth daily. Provider, Historical   atorvastatin (LIPITOR) 10 mg tablet Take 10 mg by mouth daily. Provider, Historical   glipiZIDE SR (GLUCOTROL XL) 5 mg CR tablet Take 5 mg by mouth daily. Provider, Historical   valsartan-hydroCHLOROthiazide (DIOVAN-HCT) 320-25 mg per tablet Take 1 Tab by mouth daily. Provider, Historical     REVIEW OF SYSTEMS:  POSITIVE= Bold.   Negative = normal text  General:  fever, chills, sweats, generalized weakness, weight loss/gain, loss of appetite  Eyes:  blurred vision, eye pain, loss of vision, diplopia  Ear Nose and Throat:  rhinorrhea, pharyngitis  Respiratory:   cough, sputum production, SOB, wheezing, NUNES, pleuritic pain  Cardiology:  chest pain, palpitations, orthopnea, PND, edema, syncope   Gastrointestinal:  abdominal pain, N/V, dysphagia, diarrhea, constipation, bleeding  Genitourinary:  frequency, urgency, dysuria, hematuria, incontinence  Muskuloskeletal :  arthralgia, myalgia  Hematology:  easy bruising, bleeding, lymphadenopathy  Dermatological:  rash, ulceration, pruritis  Endocrine:  hot flashes or polydipsia  Neurological:  headache, dizziness, confusion, focal weakness, paresthesia, memory loss, gait disturbance  Psychological: anxiety, depression, agitation      Objective:   VITALS:    Visit Vitals  /70   Pulse 83   Temp 98.5 °F (36.9 °C)   Resp 25   Ht 5' 10\" (1.778 m)   Wt 100.9 kg (222 lb 7.1 oz)   SpO2 93%   BMI 31.92 kg/m²     Temp (24hrs), Av.5 °F (36.9 °C), Min:98.5 °F (36.9 °C), Max:98.5 °F (36.9 °C)    Body mass index is 31.92 kg/m². PHYSICAL EXAM:    General:    Alert, cooperative, no distress, appears stated age. HEENT: Atraumatic, anicteric sclerae, pink conjunctivae     No oral ulcers, mucosa moist, throat clear. Hearing intact. Neck:  Supple, symmetrical,  thyroid: non tender  Lungs:   Bilateral basilar crackles no Wheezing or Rhonchi. Chest wall:  No tenderness  No Accessory muscle use. Heart:   Regular  rhythm,  No  murmur   No gallop. No edema. Abdomen:   Soft, non-tender. Not distended. Bowel sounds normal. No masses  Extremities: No cyanosis. No clubbing  Skin:     Not pale Not Jaundiced  No rashes   Psych:  Good insight. Not depressed. Not anxious or agitated. Neurologic: EOMs intact. No facial asymmetry. No aphasia or slurred speech. Symmetrical strength, Alert and oriented X 3.    Peripheral pulse: Bilateral, DP, 2+  Capillary refill:  normal    IMAGING RESULTS:   []       I have personally reviewed the actual   []     CXR  []     CT scan  CXR: Pending   CT :  EKG: Normal sinus 91, no ischemia   ________________________________________________________________________  Care Plan discussed with:    Comments   Patient y    [facility]:      ________________________________________________________________________  Prophylaxis:  GI none DVT  Lovenox   ________________________________________________________________________  Recommended Disposition:   Home with Family y   HH/PT/OT/RN    SNF/LTC    [facility]    ________________________________________________________________________  Code Status:  Full Code y   DNR/DNI    ________________________________________________________________________  TOTAL TIME:  55 minutes      Comments     Reviewed previous records   >50% of visit spent in counseling and coordination of care  Discussion with patient and/or family and questions answered         ______________________________________________________________________  Sukumar Andrade MD      Procedures: see electronic medical records for all procedures/Xrays and details which were not copied into this note but were reviewed prior to creation of Plan.     LAB DATA REVIEWED:    Recent Results (from the past 24 hour(s))   EKG, 12 LEAD, INITIAL    Collection Time: 09/21/20 11:23 AM   Result Value Ref Range    Ventricular Rate 91 BPM    Atrial Rate 91 BPM    P-R Interval 144 ms    QRS Duration 94 ms    Q-T Interval 368 ms    QTC Calculation (Bezet) 452 ms    Calculated P Axis 41 degrees    Calculated R Axis -7 degrees    Calculated T Axis 27 degrees    Diagnosis       Normal sinus rhythm  Minimal voltage criteria for LVH, may be normal variant  No previous ECGs available  Confirmed by Fanny Najera ([id number]) on 9/21/2020 1:24:27 PM     CBC WITH AUTOMATED DIFF    Collection Time: 09/21/20 11:34 AM   Result Value Ref Range    WBC 11.1 4.1 - 11.1 K/uL    RBC 5.64 4.10 - 5.70 M/uL    HGB 13.3 12.1 - 17.0 g/dL    HCT 41.7 36.6 - 50.3 %    MCV 73.9 (L) 80.0 - 99.0 FL    MCH 23.6 (L) 26.0 - 34.0 PG    MCHC 31.9 30.0 - 36.5 g/dL    RDW 16.5 (H) 11.5 - 14.5 %    PLATELET 871 (H) 040 - 400 K/uL    MPV 9.8 8.9 - 12.9 FL    NRBC 0.0 0  WBC    ABSOLUTE NRBC 0.00 0.00 - 0.01 K/uL    NEUTROPHILS 67 32 - 75 %    LYMPHOCYTES 16 12 - 49 %    MONOCYTES 15 (H) 5 - 13 % EOSINOPHILS 1 0 - 7 %    BASOPHILS 0 0 - 1 %    IMMATURE GRANULOCYTES 1 (H) 0.0 - 0.5 %    ABS. NEUTROPHILS 7.6 1.8 - 8.0 K/UL    ABS. LYMPHOCYTES 1.7 0.8 - 3.5 K/UL    ABS. MONOCYTES 1.6 (H) 0.0 - 1.0 K/UL    ABS. EOSINOPHILS 0.1 0.0 - 0.4 K/UL    ABS. BASOPHILS 0.1 0.0 - 0.1 K/UL    ABS. IMM. GRANS. 0.1 (H) 0.00 - 0.04 K/UL    DF AUTOMATED     METABOLIC PANEL, COMPREHENSIVE    Collection Time: 09/21/20 11:34 AM   Result Value Ref Range    Sodium 136 136 - 145 mmol/L    Potassium 3.3 (L) 3.5 - 5.1 mmol/L    Chloride 100 97 - 108 mmol/L    CO2 30 21 - 32 mmol/L    Anion gap 6 5 - 15 mmol/L    Glucose 324 (H) 65 - 100 mg/dL    BUN 14 6 - 20 MG/DL    Creatinine 1.06 0.70 - 1.30 MG/DL    BUN/Creatinine ratio 13 12 - 20      GFR est AA >60 >60 ml/min/1.73m2    GFR est non-AA >60 >60 ml/min/1.73m2    Calcium 8.9 8.5 - 10.1 MG/DL    Bilirubin, total 0.4 0.2 - 1.0 MG/DL    ALT (SGPT) 25 12 - 78 U/L    AST (SGOT) 25 15 - 37 U/L    Alk. phosphatase 71 45 - 117 U/L    Protein, total 8.6 (H) 6.4 - 8.2 g/dL    Albumin 2.5 (L) 3.5 - 5.0 g/dL    Globulin 6.1 (H) 2.0 - 4.0 g/dL    A-G Ratio 0.4 (L) 1.1 - 2.2     TROPONIN I    Collection Time: 09/21/20 11:34 AM   Result Value Ref Range    Troponin-I, Qt. <0.05 <0.05 ng/mL   NT-PRO BNP    Collection Time: 09/21/20 11:34 AM   Result Value Ref Range    NT pro-BNP 46 <125 PG/ML   SAMPLES BEING HELD    Collection Time: 09/21/20 11:34 AM   Result Value Ref Range    SAMPLES BEING HELD  SST, BLUE, RD, BLDCS     COMMENT        Add-on orders for these samples will be processed based on acceptable specimen integrity and analyte stability, which may vary by analyte.    SARS-COV-2    Collection Time: 09/21/20 11:49 AM   Result Value Ref Range    Specimen source Nasopharyngeal      Specimen source Nasopharyngeal      COVID-19 rapid test Detected (A) NOTD      Specimen type NP Swab      Health status Symptomatic Testing

## 2020-09-21 NOTE — PROGRESS NOTES
Remdesivir Initiation Note    Angelica Tamayo meets criteria for initiation of remdesivir under the emergency use authorization (EUA) based on the following:   Known or suspected COVID-19   Severe disease (SpO2 ? 94% on RA, requiring supplemental O2, or requiring invasive mechanical ventilation)   Acceptable renal function  o CrCl ? 30 ml/min based on SCr obtained prior to initiation OR   o CrCl < 30 ml/min but the potential benefit of remdesivir outweighs the risk   Acceptable hepatic function (ALT within 5 times ULN)    I have discussed with the patient/proxy information consistent with the Fact Sheet for Patients and Parents/Caregivers\" and the patient/proxy has agreed to initiating remdesivir after being:   Given the Fact Sheet for Patients and Parents/Caregivers   Informed of the alternatives to receiving remdesivir, and    Informed that remdesivir is an unapproved drug that is authorized for use under EUA    Liver function tests will be monitored daily while on remdesivir.

## 2020-09-22 LAB
ALBUMIN SERPL-MCNC: 2.2 G/DL (ref 3.5–5)
ALBUMIN/GLOB SERPL: 0.4 {RATIO} (ref 1.1–2.2)
ALP SERPL-CCNC: 56 U/L (ref 45–117)
ALT SERPL-CCNC: 23 U/L (ref 12–78)
ANION GAP SERPL CALC-SCNC: 4 MMOL/L (ref 5–15)
AST SERPL-CCNC: 24 U/L (ref 15–37)
BASOPHILS # BLD: 0 K/UL (ref 0–0.1)
BASOPHILS NFR BLD: 0 % (ref 0–1)
BILIRUB SERPL-MCNC: 0.3 MG/DL (ref 0.2–1)
BUN SERPL-MCNC: 17 MG/DL (ref 6–20)
BUN/CREAT SERPL: 18 (ref 12–20)
CALCIUM SERPL-MCNC: 8.5 MG/DL (ref 8.5–10.1)
CHLORIDE SERPL-SCNC: 107 MMOL/L (ref 97–108)
CO2 SERPL-SCNC: 28 MMOL/L (ref 21–32)
CREAT SERPL-MCNC: 0.94 MG/DL (ref 0.7–1.3)
CRP SERPL-MCNC: 3.86 MG/DL (ref 0–0.6)
D DIMER PPP FEU-MCNC: 14.52 MG/L FEU (ref 0–0.65)
DIFFERENTIAL METHOD BLD: ABNORMAL
EOSINOPHIL # BLD: 0 K/UL (ref 0–0.4)
EOSINOPHIL NFR BLD: 0 % (ref 0–7)
ERYTHROCYTE [DISTWIDTH] IN BLOOD BY AUTOMATED COUNT: 15.6 % (ref 11.5–14.5)
FIBRINOGEN PPP-MCNC: 637 MG/DL (ref 200–475)
GLOBULIN SER CALC-MCNC: 5.3 G/DL (ref 2–4)
GLUCOSE BLD STRIP.AUTO-MCNC: 149 MG/DL (ref 65–100)
GLUCOSE BLD STRIP.AUTO-MCNC: 175 MG/DL (ref 65–100)
GLUCOSE BLD STRIP.AUTO-MCNC: 257 MG/DL (ref 65–100)
GLUCOSE SERPL-MCNC: 142 MG/DL (ref 65–100)
HCT VFR BLD AUTO: 37.9 % (ref 36.6–50.3)
HGB BLD-MCNC: 11.7 G/DL (ref 12.1–17)
IMM GRANULOCYTES # BLD AUTO: 0.1 K/UL (ref 0–0.04)
IMM GRANULOCYTES NFR BLD AUTO: 1 % (ref 0–0.5)
LDH SERPL L TO P-CCNC: 348 U/L (ref 85–241)
LYMPHOCYTES # BLD: 1.4 K/UL (ref 0.8–3.5)
LYMPHOCYTES NFR BLD: 11 % (ref 12–49)
MCH RBC QN AUTO: 23 PG (ref 26–34)
MCHC RBC AUTO-ENTMCNC: 30.9 G/DL (ref 30–36.5)
MCV RBC AUTO: 74.5 FL (ref 80–99)
MONOCYTES # BLD: 1.6 K/UL (ref 0–1)
MONOCYTES NFR BLD: 13 % (ref 5–13)
NEUTS SEG # BLD: 9.3 K/UL (ref 1.8–8)
NEUTS SEG NFR BLD: 75 % (ref 32–75)
NRBC # BLD: 0 K/UL (ref 0–0.01)
NRBC BLD-RTO: 0 PER 100 WBC
PLATELET # BLD AUTO: 431 K/UL (ref 150–400)
PMV BLD AUTO: 9.7 FL (ref 8.9–12.9)
POTASSIUM SERPL-SCNC: 4.2 MMOL/L (ref 3.5–5.1)
PROT SERPL-MCNC: 7.5 G/DL (ref 6.4–8.2)
RBC # BLD AUTO: 5.09 M/UL (ref 4.1–5.7)
SERVICE CMNT-IMP: ABNORMAL
SODIUM SERPL-SCNC: 139 MMOL/L (ref 136–145)
WBC # BLD AUTO: 12.5 K/UL (ref 4.1–11.1)

## 2020-09-22 PROCEDURE — 86140 C-REACTIVE PROTEIN: CPT

## 2020-09-22 PROCEDURE — 74011250636 HC RX REV CODE- 250/636: Performed by: HOSPITALIST

## 2020-09-22 PROCEDURE — 85025 COMPLETE CBC W/AUTO DIFF WBC: CPT

## 2020-09-22 PROCEDURE — 36415 COLL VENOUS BLD VENIPUNCTURE: CPT

## 2020-09-22 PROCEDURE — 83615 LACTATE (LD) (LDH) ENZYME: CPT

## 2020-09-22 PROCEDURE — 77010033678 HC OXYGEN DAILY

## 2020-09-22 PROCEDURE — 85379 FIBRIN DEGRADATION QUANT: CPT

## 2020-09-22 PROCEDURE — 85384 FIBRINOGEN ACTIVITY: CPT

## 2020-09-22 PROCEDURE — 77030027138 HC INCENT SPIROMETER -A

## 2020-09-22 PROCEDURE — 74011636637 HC RX REV CODE- 636/637: Performed by: HOSPITALIST

## 2020-09-22 PROCEDURE — 82962 GLUCOSE BLOOD TEST: CPT

## 2020-09-22 PROCEDURE — 74011250637 HC RX REV CODE- 250/637: Performed by: HOSPITALIST

## 2020-09-22 PROCEDURE — 65660000000 HC RM CCU STEPDOWN

## 2020-09-22 PROCEDURE — 74011000258 HC RX REV CODE- 258: Performed by: HOSPITALIST

## 2020-09-22 PROCEDURE — 80053 COMPREHEN METABOLIC PANEL: CPT

## 2020-09-22 RX ADMIN — HUMAN INSULIN 15 UNITS: 100 INJECTION, SUSPENSION SUBCUTANEOUS at 11:06

## 2020-09-22 RX ADMIN — DEXAMETHASONE SODIUM PHOSPHATE 6 MG: 4 INJECTION, SOLUTION INTRA-ARTICULAR; INTRALESIONAL; INTRAMUSCULAR; INTRAVENOUS; SOFT TISSUE at 11:06

## 2020-09-22 RX ADMIN — AZITHROMYCIN MONOHYDRATE 500 MG: 500 INJECTION, POWDER, LYOPHILIZED, FOR SOLUTION INTRAVENOUS at 21:30

## 2020-09-22 RX ADMIN — INSULIN LISPRO 3 UNITS: 100 INJECTION, SOLUTION INTRAVENOUS; SUBCUTANEOUS at 11:06

## 2020-09-22 RX ADMIN — Medication 10 ML: at 21:00

## 2020-09-22 RX ADMIN — CEFTRIAXONE 1 G: 1 INJECTION, POWDER, FOR SOLUTION INTRAMUSCULAR; INTRAVENOUS at 20:22

## 2020-09-22 RX ADMIN — OXYCODONE HYDROCHLORIDE AND ACETAMINOPHEN 500 MG: 500 TABLET ORAL at 11:06

## 2020-09-22 RX ADMIN — OXYCODONE HYDROCHLORIDE AND ACETAMINOPHEN 500 MG: 500 TABLET ORAL at 17:50

## 2020-09-22 RX ADMIN — ZINC SULFATE 220 MG (50 MG) CAPSULE 1 CAPSULE: CAPSULE at 11:06

## 2020-09-22 RX ADMIN — INSULIN LISPRO 7 UNITS: 100 INJECTION, SOLUTION INTRAVENOUS; SUBCUTANEOUS at 17:50

## 2020-09-22 RX ADMIN — ENOXAPARIN SODIUM 40 MG: 40 INJECTION SUBCUTANEOUS at 11:05

## 2020-09-22 RX ADMIN — ZINC SULFATE 220 MG (50 MG) CAPSULE 1 CAPSULE: CAPSULE at 20:27

## 2020-09-22 RX ADMIN — Medication 10 ML: at 17:50

## 2020-09-22 RX ADMIN — HUMAN INSULIN 15 UNITS: 100 INJECTION, SUSPENSION SUBCUTANEOUS at 17:51

## 2020-09-22 RX ADMIN — Medication 10 ML: at 04:14

## 2020-09-22 NOTE — PROGRESS NOTES
Hospitalist Progress Note    NAME: Stewart Mcadams   :  1960   MRN:  [id number]       Assessment / Plan:  Acute hypoxic respiratory failure, POA due to  COVID-19 infection, POA likely pneumonia. CXR pending  --82% RA, requiring 3L for sats 93-94%. Bibasilar crackles on exam  --rapid covid test positive. --follow inflammatory markers  --no fevers so far here  --follow blood cxs, NGTD  --appreciate ID consult. Patient declining remdesivir for now  --dexamethason 6mg IV daily x 10 days  --zinc, vitamin C bid x 5 days  --cont Rocephin and azithromycin     DM type 2 with hyperglycemia   --patient admits to noncompliance with meds  --on metformin only prior to admit  --A1C 11.9  --cont SSI and NPH. BS down from 300s to 175 this AM  --counseled on need for better BS control going forward     HTN  Hyperlipidemia  Obesity     Asked pharmacy to reconcile meds     Body mass index is 31.92 kg/m².     Code: full  DVT prophylaxis: lovenox  Surrogate decision maker: Mother  Aditi Aldana [phone] or cousin Yao Grimm     Subjective:     Chief Complaint / Reason for Physician Visit  C/o of some mild tingling in his b/l feet. Denies SOB/cough at this time or any fevers/chills    Discussed with RN events overnight. Review of Systems:  Symptom Y/N Comments  Symptom Y/N Comments   Fever/Chills n   Chest Pain n    Poor Appetite n   Edema     Cough n   Abdominal Pain n    Sputum n   Joint Pain     SOB/NUNES n   Pruritis/Rash     Nausea/vomit n   Tolerating PT/OT     Diarrhea n   Tolerating Diet y    Constipation n   Other       Could NOT obtain due to:      Objective:     VITALS:   Last 24hrs VS reviewed since prior progress note.  Most recent are:  Patient Vitals for the past 24 hrs:   Temp Pulse Resp BP SpO2   20 0844 97.8 °F (36.6 °C) 75 18 136/79 96 %   20 0843  78   96 %   20 0410 97.7 °F (36.5 °C) 72 18 129/85 93 %   20 0201 98.5 °F (36.9 °C) 74 20 134/79 94 %   20 2300  81 29 126/77 94 %   09/21/20 2200  84 26 121/78 96 %   09/21/20 2100  85 25 136/72 91 %   09/21/20 1900 98.7 °F (37.1 °C) 83 18 123/67 93 %   09/21/20 1500  78 22 135/77 95 %   09/21/20 1400  84 20 126/74 93 %       Intake/Output Summary (Last 24 hours) at 9/22/2020 1333  Last data filed at 9/22/2020 0201  Gross per 24 hour   Intake 250 ml   Output    Net 250 ml        PHYSICAL EXAM:  General: WD, WN. Alert, cooperative, no acute distress    EENT:  EOMI. Anicteric sclerae. MMM  Resp:  CTA bilaterally, no wheezing or rales. No accessory muscle use  CV:  Regular  rhythm,  No edema  GI:  Soft, Non distended, Non tender.  +Bowel sounds  Neurologic:  Alert and oriented X 3, normal speech,   Psych:   Not anxious nor agitated  Skin:  No rashes. No jaundice    Reviewed most current lab test results and cultures  YES  Reviewed most current radiology test results   YES  Review and summation of old records today    NO  Reviewed patient's current orders and MAR    YES  PMH/ reviewed - no change compared to H&P  ________________________________________________________________________  Care Plan discussed with:    Comments   Patient x    Family      RN x    Care Manager     Consultant                       x Multidiciplinary team rounds were held today with , nursing, pharmacist and clinical coordinator. Patient's plan of care was discussed; medications were reviewed and discharge planning was addressed.      ________________________________________________________________________  Total NON critical care TIME:  30  Minutes    Total CRITICAL CARE TIME Spent:   Minutes non procedure based      Comments   >50% of visit spent in counseling and coordination of care x    ________________________________________________________________________  Mickiel Meckel, DO     Procedures: see electronic medical records for all procedures/Xrays and details which were not copied into this note but were reviewed prior to creation of Plan. LABS:  I reviewed today's most current labs and imaging studies.   Pertinent labs include:  Recent Labs     09/22/20  0402 09/21/20  1134   WBC 12.5* 11.1   HGB 11.7* 13.3   HCT 37.9 41.7   * 504*     Recent Labs     09/22/20  0402 09/21/20  1445 09/21/20  1134     --  136   K 4.2  --  3.3*     --  100   CO2 28  --  30   *  --  324*   BUN 17  --  14   CREA 0.94  --  1.06   CA 8.5  --  8.9   ALB 2.2*  --  2.5*   TBILI 0.3  --  0.4   ALT 23  --  25   INR  --  1.1  --        Signed: Celi Acevedo, DO

## 2020-09-22 NOTE — ED NOTES
TRANSFER - OUT REPORT:    Verbal report given to [facility] RN on Gregg Comment  being transferred to PCU for routine progression of care       Report consisted of patients Situation, Background, Assessment and   Recommendations(SBAR). Information from the following report(s) SBAR, ED Summary, [facility] and Recent Results was reviewed with the receiving nurse.     Lines:   Peripheral IV 09/21/20 Right Antecubital (Active)       Peripheral IV 09/21/20 Left Antecubital (Active)

## 2020-09-22 NOTE — PROGRESS NOTES
0200 - arrived from ER in Bed. NSR. No pain or distress. VSS. o2 3l nc. Denies dyspnea or NUNES.     6483 - status 'the same', no distress on 3l nc. Labs drawn. VSS.     0700 - Bedside report to RN.   NSR.

## 2020-09-22 NOTE — PROGRESS NOTES
Reason for Admission: Patient came to ed for complaint of hypoxia, loss of appetite, and diarrhea. He works as . PMHX significant for htn, dm and hyperlipedemia. RUR Score:  11%                     Plan for utilizing home health:  He has not used home health or snf in the past.           PCP: First and Last name:  Patient has not been to md since March--he states he really does not have pcp. Name of Practice: In process of looking for pcp for him. Are you a current patient: Yes/No:  In process of looking for pcp. Approximate date of last visit:  Will get a pcp for patient. Can you participate in a virtual visit with your PCP:  Yes                      Current Advanced Directive/Advance Care Plan: Patient does not want ACP at this time. Transition of Care Plan:   Patient lives in one story home alone. He states he was independent prior to coming to the hospital. He works. He does not use home oxygen or cpap. Patient states he did not get his medications because he was in the process of using natural meds. He agrees he will get his prescription meds. Confirmed demographics and he has insurance. Patient plans to return home when discharged and family will transport. Sridevi Tellez RN BSN CRM        [phone]

## 2020-09-22 NOTE — PROGRESS NOTES
Bedside and Verbal shift change report given to compa (oncoming nurse) by [facility] (offgoing nurse). Report included the following information SBAR, Kardex, Intake/Output, MAR and Quality Measures.

## 2020-09-23 LAB
GLUCOSE BLD STRIP.AUTO-MCNC: 177 MG/DL (ref 65–100)
GLUCOSE BLD STRIP.AUTO-MCNC: 225 MG/DL (ref 65–100)
GLUCOSE BLD STRIP.AUTO-MCNC: 260 MG/DL (ref 65–100)
GLUCOSE BLD STRIP.AUTO-MCNC: 271 MG/DL (ref 65–100)
SERVICE CMNT-IMP: ABNORMAL

## 2020-09-23 PROCEDURE — 74011250637 HC RX REV CODE- 250/637: Performed by: HOSPITALIST

## 2020-09-23 PROCEDURE — 74011250637 HC RX REV CODE- 250/637: Performed by: INTERNAL MEDICINE

## 2020-09-23 PROCEDURE — 74011250636 HC RX REV CODE- 250/636: Performed by: HOSPITALIST

## 2020-09-23 PROCEDURE — 74011000258 HC RX REV CODE- 258: Performed by: HOSPITALIST

## 2020-09-23 PROCEDURE — 74011636637 HC RX REV CODE- 636/637: Performed by: HOSPITALIST

## 2020-09-23 PROCEDURE — 65660000000 HC RM CCU STEPDOWN

## 2020-09-23 PROCEDURE — 82962 GLUCOSE BLOOD TEST: CPT

## 2020-09-23 PROCEDURE — 77010033678 HC OXYGEN DAILY

## 2020-09-23 RX ORDER — AZITHROMYCIN 250 MG/1
500 TABLET, FILM COATED ORAL EVERY EVENING
Status: DISCONTINUED | OUTPATIENT
Start: 2020-09-23 | End: 2020-09-24 | Stop reason: HOSPADM

## 2020-09-23 RX ORDER — ENOXAPARIN SODIUM 100 MG/ML
40 INJECTION SUBCUTANEOUS EVERY 12 HOURS
Status: DISCONTINUED | OUTPATIENT
Start: 2020-09-23 | End: 2020-09-24 | Stop reason: HOSPADM

## 2020-09-23 RX ADMIN — AZITHROMYCIN 500 MG: 250 TABLET, FILM COATED ORAL at 18:03

## 2020-09-23 RX ADMIN — INSULIN LISPRO 7 UNITS: 100 INJECTION, SOLUTION INTRAVENOUS; SUBCUTANEOUS at 14:14

## 2020-09-23 RX ADMIN — DEXAMETHASONE SODIUM PHOSPHATE 6 MG: 4 INJECTION, SOLUTION INTRA-ARTICULAR; INTRALESIONAL; INTRAMUSCULAR; INTRAVENOUS; SOFT TISSUE at 09:04

## 2020-09-23 RX ADMIN — Medication 20 ML: at 06:00

## 2020-09-23 RX ADMIN — INSULIN LISPRO 3 UNITS: 100 INJECTION, SOLUTION INTRAVENOUS; SUBCUTANEOUS at 09:05

## 2020-09-23 RX ADMIN — Medication 10 ML: at 21:05

## 2020-09-23 RX ADMIN — ZINC SULFATE 220 MG (50 MG) CAPSULE 1 CAPSULE: CAPSULE at 09:04

## 2020-09-23 RX ADMIN — OXYCODONE HYDROCHLORIDE AND ACETAMINOPHEN 500 MG: 500 TABLET ORAL at 09:04

## 2020-09-23 RX ADMIN — INSULIN LISPRO 4 UNITS: 100 INJECTION, SOLUTION INTRAVENOUS; SUBCUTANEOUS at 18:04

## 2020-09-23 RX ADMIN — CEFTRIAXONE 1 G: 1 INJECTION, POWDER, FOR SOLUTION INTRAMUSCULAR; INTRAVENOUS at 21:05

## 2020-09-23 RX ADMIN — HUMAN INSULIN 15 UNITS: 100 INJECTION, SUSPENSION SUBCUTANEOUS at 09:04

## 2020-09-23 RX ADMIN — ENOXAPARIN SODIUM 40 MG: 40 INJECTION SUBCUTANEOUS at 09:04

## 2020-09-23 RX ADMIN — OXYCODONE HYDROCHLORIDE AND ACETAMINOPHEN 500 MG: 500 TABLET ORAL at 18:03

## 2020-09-23 RX ADMIN — Medication 10 ML: at 14:14

## 2020-09-23 RX ADMIN — ZINC SULFATE 220 MG (50 MG) CAPSULE 1 CAPSULE: CAPSULE at 21:05

## 2020-09-23 RX ADMIN — INSULIN LISPRO 4 UNITS: 100 INJECTION, SOLUTION INTRAVENOUS; SUBCUTANEOUS at 21:07

## 2020-09-23 RX ADMIN — HUMAN INSULIN 15 UNITS: 100 INJECTION, SUSPENSION SUBCUTANEOUS at 18:04

## 2020-09-23 NOTE — PROGRESS NOTES
Bedside and Verbal shift change report given to [address] (oncoming nurse) by  Margy christensen). Report included the following information SBAR, Kardex, Intake/Output, MAR and Quality Measures.

## 2020-09-23 NOTE — PROGRESS NOTES
Hospitalist Progress Note    NAME: Reyna Lester   :  1960   MRN:  [id number]       Assessment / Plan:  Acute hypoxic respiratory failure, POA due to  COVID-19 infection, POA likely pneumonia. CXR pending  --82% RA on admission, requiring 3L for sats 93-94%. Bibasilar crackles on exam  --rapid covid test positive. --trend inflammatory markers  --no fevers so far here  --follow blood cxs, NGTD  --appreciate ID consult. Patient declining remdesivir for now  --dexamethason 6mg IV daily x 10 days  --zinc, vitamin C bid x 5 days  --cont Rocephin and azithromycin  --wean O2 as able, maintaining sats > 92 %     DM type 2 with hyperglycemia   --patient admits to noncompliance with meds  --on metformin only prior to admit  --A1C 11.9  --cont SSI and NPH. BS down from 300s to 175 this AM  --counseled on need for better BS control going forward     HTN  Hyperlipidemia  Obesity     Asked pharmacy to reconcile meds     Body mass index is 31.92 kg/m².     Code: full  DVT prophylaxis: lovenox  Surrogate decision maker: Mother  Gabrielle Mehta [phone] or cousin Brandon Ortiz     Subjective:     Chief Complaint / Reason for Physician Visit  No new complaints. Still experiencing some tenderness on the soles of his feet. No fevers/chills. Not SOB or coughing on 3L NC    Discussed with RN events overnight. Review of Systems:  Symptom Y/N Comments  Symptom Y/N Comments   Fever/Chills n   Chest Pain n    Poor Appetite n   Edema     Cough n   Abdominal Pain n    Sputum n   Joint Pain     SOB/NUNES n   Pruritis/Rash     Nausea/vomit n   Tolerating PT/OT     Diarrhea n   Tolerating Diet y    Constipation n   Other       Could NOT obtain due to:      Objective:     VITALS:   Last 24hrs VS reviewed since prior progress note.  Most recent are:  Patient Vitals for the past 24 hrs:   Temp Pulse Resp BP SpO2   20 1109 98.2 °F (36.8 °C) 69 18 (!) 144/89 97 %   20 0754 97.7 °F (36.5 °C) 63 18 134/84 94 % 09/22/20 2300 98.3 °F (36.8 °C) 60 18 (!) 157/83 93 %   09/22/20 2016 98.2 °F (36.8 °C) 69 18 131/72 92 %   09/22/20 1454 97.8 °F (36.6 °C) 73 18 138/79 91 %       Intake/Output Summary (Last 24 hours) at 9/23/2020 1346  Last data filed at 9/23/2020 0830  Gross per 24 hour   Intake 720 ml   Output 0 ml   Net 720 ml        PHYSICAL EXAM:  General: WD, WN. Alert, cooperative, no acute distress    EENT:  EOMI. Anicteric sclerae. MMM  Resp:  CTA bilaterally, no wheezing or rales. No accessory muscle use  CV:  Regular  rhythm,  No edema  GI:  Soft, Non distended, Non tender.  +Bowel sounds  Neurologic:  Alert and oriented X 3, normal speech,   Psych:   Not anxious nor agitated  Skin:  No rashes. No jaundice    Reviewed most current lab test results and cultures  YES  Reviewed most current radiology test results   YES  Review and summation of old records today    NO  Reviewed patient's current orders and MAR    YES  PMH/SH reviewed - no change compared to H&P  ________________________________________________________________________  Care Plan discussed with:    Comments   Patient x    Family      RN x    Care Manager     Consultant                       x Multidiciplinary team rounds were held today with , nursing, pharmacist and clinical coordinator. Patient's plan of care was discussed; medications were reviewed and discharge planning was addressed. ________________________________________________________________________  Total NON critical care TIME:  25  Minutes    Total CRITICAL CARE TIME Spent:   Minutes non procedure based      Comments   >50% of visit spent in counseling and coordination of care x    ________________________________________________________________________  He Masrh DO     Procedures: see electronic medical records for all procedures/Xrays and details which were not copied into this note but were reviewed prior to creation of Plan.       LABS:  I reviewed today's most current labs and imaging studies.   Pertinent labs include:  Recent Labs     09/22/20  0402 09/21/20  1134   WBC 12.5* 11.1   HGB 11.7* 13.3   HCT 37.9 41.7   * 504*     Recent Labs     09/22/20  0402 09/21/20  1445 09/21/20  1134     --  136   K 4.2  --  3.3*     --  100   CO2 28  --  30   *  --  324*   BUN 17  --  14   CREA 0.94  --  1.06   CA 8.5  --  8.9   ALB 2.2*  --  2.5*   TBILI 0.3  --  0.4   ALT 23  --  25   INR  --  1.1  --        Signed: Melvin Son DO

## 2020-09-24 VITALS
WEIGHT: 222.44 LBS | DIASTOLIC BLOOD PRESSURE: 66 MMHG | HEIGHT: 70 IN | BODY MASS INDEX: 31.85 KG/M2 | OXYGEN SATURATION: 98 % | TEMPERATURE: 97.6 F | SYSTOLIC BLOOD PRESSURE: 132 MMHG | RESPIRATION RATE: 20 BRPM | HEART RATE: 74 BPM

## 2020-09-24 LAB
ANION GAP SERPL CALC-SCNC: 8 MMOL/L (ref 5–15)
BASOPHILS # BLD: 0 K/UL (ref 0–0.1)
BASOPHILS NFR BLD: 0 % (ref 0–1)
BUN SERPL-MCNC: 13 MG/DL (ref 6–20)
BUN/CREAT SERPL: 19 (ref 12–20)
CALCIUM SERPL-MCNC: 8.4 MG/DL (ref 8.5–10.1)
CHLORIDE SERPL-SCNC: 107 MMOL/L (ref 97–108)
CO2 SERPL-SCNC: 24 MMOL/L (ref 21–32)
CREAT SERPL-MCNC: 0.67 MG/DL (ref 0.7–1.3)
CRP SERPL-MCNC: 1.05 MG/DL (ref 0–0.6)
D DIMER PPP FEU-MCNC: 7.9 MG/L FEU (ref 0–0.65)
DIFFERENTIAL METHOD BLD: ABNORMAL
EOSINOPHIL # BLD: 0.1 K/UL (ref 0–0.4)
EOSINOPHIL NFR BLD: 1 % (ref 0–7)
ERYTHROCYTE [DISTWIDTH] IN BLOOD BY AUTOMATED COUNT: 15.8 % (ref 11.5–14.5)
FIBRINOGEN PPP-MCNC: 590 MG/DL (ref 200–475)
GLUCOSE BLD STRIP.AUTO-MCNC: 145 MG/DL (ref 65–100)
GLUCOSE BLD STRIP.AUTO-MCNC: 244 MG/DL (ref 65–100)
GLUCOSE SERPL-MCNC: 100 MG/DL (ref 65–100)
HCT VFR BLD AUTO: 36.4 % (ref 36.6–50.3)
HGB BLD-MCNC: 11.6 G/DL (ref 12.1–17)
IMM GRANULOCYTES # BLD AUTO: 0.1 K/UL (ref 0–0.04)
IMM GRANULOCYTES NFR BLD AUTO: 1 % (ref 0–0.5)
LDH SERPL L TO P-CCNC: 261 U/L (ref 85–241)
LYMPHOCYTES # BLD: 2 K/UL (ref 0.8–3.5)
LYMPHOCYTES NFR BLD: 19 % (ref 12–49)
MCH RBC QN AUTO: 23.6 PG (ref 26–34)
MCHC RBC AUTO-ENTMCNC: 31.9 G/DL (ref 30–36.5)
MCV RBC AUTO: 74.1 FL (ref 80–99)
MONOCYTES # BLD: 1.3 K/UL (ref 0–1)
MONOCYTES NFR BLD: 13 % (ref 5–13)
NEUTS SEG # BLD: 7 K/UL (ref 1.8–8)
NEUTS SEG NFR BLD: 66 % (ref 32–75)
NRBC # BLD: 0 K/UL (ref 0–0.01)
NRBC BLD-RTO: 0 PER 100 WBC
PLATELET # BLD AUTO: 439 K/UL (ref 150–400)
PMV BLD AUTO: 10 FL (ref 8.9–12.9)
POTASSIUM SERPL-SCNC: 3.5 MMOL/L (ref 3.5–5.1)
RBC # BLD AUTO: 4.91 M/UL (ref 4.1–5.7)
SERVICE CMNT-IMP: ABNORMAL
SERVICE CMNT-IMP: ABNORMAL
SODIUM SERPL-SCNC: 139 MMOL/L (ref 136–145)
WBC # BLD AUTO: 10.6 K/UL (ref 4.1–11.1)

## 2020-09-24 PROCEDURE — 90471 IMMUNIZATION ADMIN: CPT

## 2020-09-24 PROCEDURE — 74011250637 HC RX REV CODE- 250/637: Performed by: HOSPITALIST

## 2020-09-24 PROCEDURE — 86140 C-REACTIVE PROTEIN: CPT

## 2020-09-24 PROCEDURE — 74011250636 HC RX REV CODE- 250/636: Performed by: INTERNAL MEDICINE

## 2020-09-24 PROCEDURE — 36415 COLL VENOUS BLD VENIPUNCTURE: CPT

## 2020-09-24 PROCEDURE — 83615 LACTATE (LD) (LDH) ENZYME: CPT

## 2020-09-24 PROCEDURE — 82962 GLUCOSE BLOOD TEST: CPT

## 2020-09-24 PROCEDURE — 85379 FIBRIN DEGRADATION QUANT: CPT

## 2020-09-24 PROCEDURE — 90686 IIV4 VACC NO PRSV 0.5 ML IM: CPT | Performed by: HOSPITALIST

## 2020-09-24 PROCEDURE — 74011636637 HC RX REV CODE- 636/637: Performed by: HOSPITALIST

## 2020-09-24 PROCEDURE — 85384 FIBRINOGEN ACTIVITY: CPT

## 2020-09-24 PROCEDURE — 74011250636 HC RX REV CODE- 250/636: Performed by: HOSPITALIST

## 2020-09-24 PROCEDURE — 77010033678 HC OXYGEN DAILY

## 2020-09-24 PROCEDURE — 80048 BASIC METABOLIC PNL TOTAL CA: CPT

## 2020-09-24 PROCEDURE — 85025 COMPLETE CBC W/AUTO DIFF WBC: CPT

## 2020-09-24 RX ORDER — LEVOFLOXACIN 750 MG/1
750 TABLET ORAL DAILY
Qty: 3 TAB | Refills: 0 | Status: SHIPPED | OUTPATIENT
Start: 2020-09-24 | End: 2020-09-27

## 2020-09-24 RX ADMIN — ENOXAPARIN SODIUM 40 MG: 40 INJECTION SUBCUTANEOUS at 11:36

## 2020-09-24 RX ADMIN — INSULIN LISPRO 4 UNITS: 100 INJECTION, SOLUTION INTRAVENOUS; SUBCUTANEOUS at 12:28

## 2020-09-24 RX ADMIN — ZINC SULFATE 220 MG (50 MG) CAPSULE 1 CAPSULE: CAPSULE at 09:14

## 2020-09-24 RX ADMIN — INFLUENZA VIRUS VACCINE 0.5 ML: 15; 15; 15; 15 SUSPENSION INTRAMUSCULAR at 13:52

## 2020-09-24 RX ADMIN — OXYCODONE HYDROCHLORIDE AND ACETAMINOPHEN 500 MG: 500 TABLET ORAL at 09:14

## 2020-09-24 RX ADMIN — DEXAMETHASONE SODIUM PHOSPHATE 6 MG: 4 INJECTION, SOLUTION INTRA-ARTICULAR; INTRALESIONAL; INTRAMUSCULAR; INTRAVENOUS; SOFT TISSUE at 09:14

## 2020-09-24 RX ADMIN — INSULIN LISPRO 3 UNITS: 100 INJECTION, SOLUTION INTRAVENOUS; SUBCUTANEOUS at 09:13

## 2020-09-24 RX ADMIN — HUMAN INSULIN 15 UNITS: 100 INJECTION, SUSPENSION SUBCUTANEOUS at 09:13

## 2020-09-24 NOTE — DISCHARGE SUMMARY
Hospitalist Discharge Summary     Patient ID:  Prasanna Salomon  [id number]  64 y.o.  1960 9/21/2020    PCP on record: Unknown, Provider    Admit date: 9/21/2020  Discharge date and time: 9/24/2020    DISCHARGE DIAGNOSIS:    See below    CONSULTATIONS:  IP CONSULT TO HOSPITALIST  IP CONSULT TO INFECTIOUS DISEASES    Excerpted HPI from H&P of Masha Rodriguez MD:  Prasanna Salomon is a 64 y.o. male with PMH HTN, diabetes, hyperlipidemia with 1 week history of loss of appetite, no fever, chills. +cough shortness of breath, gen weakness, diarrhea x1, loss of taste. Family made him come to ER today. Rapid COVID test positive. Denies any chest pain, abdominal pain nausea vomiting or dysuria. O2 sat 82% on room air. Has been noncompliant with his medication for a month and a half.  ______________________________________________________________________  DISCHARGE SUMMARY/HOSPITAL COURSE:  for full details see H&P, daily progress notes, labs, consult notes. Acute hypoxic respiratory failure, POA due to  COVID-19 infection, POA likely pneumonia.  CXR pending  --82% RA on admission, requiring 3L for sats 93-94%.  Bibasilar crackles on exam  --rapid covid test positive. --trend inflammatory markers  --no fevers so far here  --follow blood cxs, NGTD  --appreciate ID consult. Patient declining remdesivir for now  --dexamethason 6mg IV daily x 10 days  --zinc, vitamin C bid x 5 days  --on rocephin and azithro for possible superimposed bacterial pna but no fevers or leukocytosis. Will complete PO levaquin on DC for total of 5days of antibiotics  --maintaining sats on RA on day of DC     DM type 2 with hyperglycemia   --patient admits to noncompliance with meds  --on metformin only prior to admit  --A1C 11.9  --cont SSI and NPH.  BS down from 300s to 175 this AM  --counseled on need for better BS control going forward     HTN  -resume almodipine and Diovan-HCT    Hyperlipidemia  -resume atorvastatin    Obesity  -counseled on weight loss  _______________________________________________________________________  Patient seen and examined by me on discharge day. Pertinent Findings:  Gen:    Not in distress  Chest: Clear lungs  CVS:   Regular rhythm. No edema  Abd:  Soft, not distended, not tender  Neuro:  Alert, oriented x 3  _______________________________________________________________________  DISCHARGE MEDICATIONS:   Current Discharge Medication List      START taking these medications    Details   levoFLOXacin (LEVAQUIN) 750 mg tablet Take 1 Tab by mouth daily for 3 days. Qty: 3 Tab, Refills: 0         CONTINUE these medications which have NOT CHANGED    Details   metFORMIN (GLUCOPHAGE) 1,000 mg tablet Take 1,000 mg by mouth two (2) times daily (with meals). amLODIPine (NORVASC) 10 mg tablet Take 10 mg by mouth daily. atorvastatin (LIPITOR) 10 mg tablet Take 10 mg by mouth daily. glipiZIDE SR (GLUCOTROL XL) 5 mg CR tablet Take 5 mg by mouth daily. valsartan-hydroCHLOROthiazide (DIOVAN-HCT) 320-25 mg per tablet Take 1 Tab by mouth daily. Patient Follow Up Instructions: Activity: Activity as tolerated.  Advised on isolation at home per [facility] guidlines  Diet: Diabetic Diet, low saturated fat, low cholesterol  Wound Care: None needed    Follow-up as below  Follow-up tests/labs none pending  Follow-up Information     Follow up With Specialties Details Why Contact Info    Cora Deleon MD Internal Medicine On 9/29/2020 For Virtual new patient appointment at 10:10AM  [address]  [phone]      Unknown, Provider    Patient not available to ask          ________________________________________________________________    Risk of deterioration: Moderate    Condition at Discharge:  Stable  __________________________________________________________________    Disposition  Home with family, no needs    ____________________________________________________________________    Code Status: Full Code  ___________________________________________________________________      Total time in minutes spent coordinating this discharge (includes going over instructions, follow-up, prescriptions, and preparing report for sign off to her PCP) :  35 minutes    Signed:  Joe Barton DO

## 2020-09-24 NOTE — PROGRESS NOTES
Problem: Falls - Risk of  Goal: *Absence of Falls  Description: Document Robert Christian Fall Risk and appropriate interventions in the flowsheet.   Outcome: Progressing Towards Goal  Note: Fall Risk Interventions:            Medication Interventions: Patient to call before getting OOB, Teach patient to arise slowly

## 2020-09-24 NOTE — PROGRESS NOTES
TERRI PLAN--Home with family. Patient is being discharged home today. He states his family will be transporting him home. He is aware he has a pcp now and he has an appointment set up which is on AVS.      Sridevi Tellez RN BSN CRM        [phone]

## 2020-09-24 NOTE — PROGRESS NOTES
1450: I have reviewed discharge instructions with the patient. All questions were answered. The patient verbalized understanding. Informed the patient that the prescription for levaquin has been sent to his pharmacy. A signed copy of AVS left in chart due to signature pad malfunctioning. IVs and tele monitor removed. Patient waiting to be picked up by cousin, Juan Gallardo. Patient told this nurse that he needs note for his work that includes why the patient was here and when he can get back to work. Notified Dr. Bruce Valdes, he said it will be a little bit. 1525:  The patient's ride is here and waiting. Messaged Dr. Bruce Valdes to see how long it will take for him to write a note. He said the patient can leave and we can fax this. Patient said he would like to get it through email. Received patient's email address, informed him we will email it ASA. 1530: Patient was escorted down to the discharge area by the nurse.

## 2020-09-24 NOTE — PROGRESS NOTES
To whom it may concern,    Mickie Davis was hospitalized as an inpatient at [facility] from 9/21/2020 to 9/24/2020. He is clear to return to work without restriction 10/1/2020.

## 2020-09-24 NOTE — PROGRESS NOTES
Problem: Risk for Spread of Infection  Goal: Prevent transmission of infectious organism to others  Description: Prevent the transmission of infectious organisms to other patients, staff members, and visitors. Outcome: Progressing Towards Goal     Problem: Diabetes Self-Management  Goal: *Disease process and treatment process  Description: Define diabetes and identify own type of diabetes; list 3 options for treating diabetes. Outcome: Progressing Towards Goal  Goal: *Incorporating nutritional management into lifestyle  Description: Describe effect of type, amount and timing of food on blood glucose; list 3 methods for planning meals. Outcome: Progressing Towards Goal  Goal: *Incorporating physical activity into lifestyle  Description: State effect of exercise on blood glucose levels. Outcome: Progressing Towards Goal  Goal: *Developing strategies to promote health/change behavior  Description: Define the ABC's of diabetes; identify appropriate screenings, schedule and personal plan for screenings. Outcome: Progressing Towards Goal  Goal: *Using medications safely  Description: State effect of diabetes medications on diabetes; name diabetes medication taking, action and side effects. Outcome: Progressing Towards Goal  Goal: *Monitoring blood glucose, interpreting and using results  Description: Identify recommended blood glucose targets  and personal targets. Outcome: Progressing Towards Goal  Goal: *Prevention, detection, treatment of acute complications  Description: List symptoms of hyper- and hypoglycemia; describe how to treat low blood sugar and actions for lowering  high blood glucose level. Outcome: Progressing Towards Goal  Goal: *Prevention, detection and treatment of chronic complications  Description: Define the natural course of diabetes and describe the relationship of blood glucose levels to long term complications of diabetes.   Outcome: Progressing Towards Goal  Goal: *Developing strategies to address psychosocial issues  Description: Describe feelings about living with diabetes; identify support needed and support network  Outcome: Progressing Towards Goal     Problem: General Medical Care Plan  Goal: *Vital signs within specified parameters  Outcome: Progressing Towards Goal  Goal: *Labs within defined limits  Outcome: Progressing Towards Goal  Goal: *Absence of infection signs and symptoms  Outcome: Progressing Towards Goal  Goal: *Optimal pain control at patient's stated goal  Outcome: Progressing Towards Goal  Goal: *Skin integrity maintained  Outcome: Progressing Towards Goal  Goal: *Fluid volume balance  Outcome: Progressing Towards Goal  Goal: *Optimize nutritional status  Outcome: Progressing Towards Goal  Goal: *Anxiety reduced or absent  Outcome: Progressing Towards Goal  Goal: *Progressive mobility and function (eg: ADL's)  Outcome: Progressing Towards Goal     Problem: Falls - Risk of  Goal: *Absence of Falls  Description: Document Amina Fall Risk and appropriate interventions in the flowsheet.   Outcome: Progressing Towards Goal  Note: Fall Risk Interventions:     Medication Interventions: Patient to call before getting OOB, Evaluate medications/consider consulting pharmacy, Teach patient to arise slowly

## 2020-09-24 NOTE — PROGRESS NOTES
Problem: Risk for Spread of Infection  Goal: Prevent transmission of infectious organism to others  Description: Prevent the transmission of infectious organisms to other patients, staff members, and visitors. 9/24/2020 1335 by Awa Cooper RN  Outcome: Resolved/Met  9/24/2020 1043 by Awa Cooper RN  Outcome: Progressing Towards Goal     Problem: Diabetes Self-Management  Goal: *Disease process and treatment process  Description: Define diabetes and identify own type of diabetes; list 3 options for treating diabetes. 9/24/2020 1335 by Awa Cooper RN  Outcome: Resolved/Met  9/24/2020 1043 by Awa Cooper RN  Outcome: Progressing Towards Goal  Goal: *Incorporating nutritional management into lifestyle  Description: Describe effect of type, amount and timing of food on blood glucose; list 3 methods for planning meals. 9/24/2020 1335 by Awa Cooper RN  Outcome: Resolved/Met  9/24/2020 1043 by Awa Cooper RN  Outcome: Progressing Towards Goal  Goal: *Incorporating physical activity into lifestyle  Description: State effect of exercise on blood glucose levels. 9/24/2020 1335 by Awa Cooper RN  Outcome: Resolved/Met  9/24/2020 1043 by Awa Cooper RN  Outcome: Progressing Towards Goal  Goal: *Developing strategies to promote health/change behavior  Description: Define the ABC's of diabetes; identify appropriate screenings, schedule and personal plan for screenings. 9/24/2020 1335 by Awa Cooper RN  Outcome: Resolved/Met  9/24/2020 1043 by Awa Cooper RN  Outcome: Progressing Towards Goal  Goal: *Using medications safely  Description: State effect of diabetes medications on diabetes; name diabetes medication taking, action and side effects.   9/24/2020 1335 by Awa Cooper RN  Outcome: Resolved/Met  9/24/2020 1043 by Awa Cooper RN  Outcome: Progressing Towards Goal  Goal: *Monitoring blood glucose, interpreting and using results  Description: Identify recommended blood glucose targets  and personal targets. 9/24/2020 1335 by Sonia Jeans, RN  Outcome: Resolved/Met  9/24/2020 1043 by Sonia Jeans, RN  Outcome: Progressing Towards Goal  Goal: *Prevention, detection, treatment of acute complications  Description: List symptoms of hyper- and hypoglycemia; describe how to treat low blood sugar and actions for lowering  high blood glucose level. 9/24/2020 1335 by Sonia Jeans, RN  Outcome: Resolved/Met  9/24/2020 1043 by Sonia Jeans, RN  Outcome: Progressing Towards Goal  Goal: *Prevention, detection and treatment of chronic complications  Description: Define the natural course of diabetes and describe the relationship of blood glucose levels to long term complications of diabetes.   9/24/2020 1335 by Sonia Jeans, RN  Outcome: Resolved/Met  9/24/2020 1043 by Sonia Jeans, RN  Outcome: Progressing Towards Goal  Goal: *Developing strategies to address psychosocial issues  Description: Describe feelings about living with diabetes; identify support needed and support network  9/24/2020 1335 by Sonia Jeans, RN  Outcome: Resolved/Met  9/24/2020 1043 by Sonia Jeans, RN  Outcome: Progressing Towards Goal  Goal: *Insulin pump training  Outcome: Resolved/Met  Goal: *Sick day guidelines  Outcome: Resolved/Met  Goal: *Patient Specific Goal (EDIT GOAL, INSERT TEXT)  Outcome: Resolved/Met     Problem: General Medical Care Plan  Goal: *Vital signs within specified parameters  9/24/2020 1335 by Sonia Jeans, RN  Outcome: Resolved/Met  9/24/2020 1043 by Sonia Jeans, RN  Outcome: Progressing Towards Goal  Goal: *Labs within defined limits  9/24/2020 1335 by Sonia Jeans, RN  Outcome: Resolved/Met  9/24/2020 1043 by Sonia Jeans, RN  Outcome: Progressing Towards Goal  Goal: *Absence of infection signs and symptoms  9/24/2020 1335 by Sonia Jeans, RN  Outcome: Resolved/Met  9/24/2020 1043 by Sonia Jeans, RN  Outcome: Progressing Towards Goal  Goal: *Optimal pain control at patient's stated goal  9/24/2020 1335 by Sonia Jeans, RN  Outcome: Resolved/Met  9/24/2020 1043 by Sonia Jeans, RN  Outcome: Progressing Towards Goal  Goal: *Skin integrity maintained  9/24/2020 1335 by Sonia Jeans, RN  Outcome: Resolved/Met  9/24/2020 1043 by Sonia Jeans, RN  Outcome: Progressing Towards Goal  Goal: *Fluid volume balance  9/24/2020 1335 by Sonia Jeans, RN  Outcome: Resolved/Met  9/24/2020 1043 by Sonia Jeans, RN  Outcome: Progressing Towards Goal  Goal: *Optimize nutritional status  9/24/2020 1335 by Sonia Jeans, RN  Outcome: Resolved/Met  9/24/2020 1043 by Sonia Jeans, RN  Outcome: Progressing Towards Goal  Goal: *Anxiety reduced or absent  9/24/2020 1335 by Sonia Jeans, RN  Outcome: Resolved/Met  9/24/2020 1043 by Sonia Jeans, RN  Outcome: Progressing Towards Goal  Goal: *Progressive mobility and function (eg: ADL's)  9/24/2020 1335 by Sonia Jeans, RN  Outcome: Resolved/Met  9/24/2020 1043 by Sonia Jeans, RN  Outcome: Progressing Towards Goal     Problem: Falls - Risk of  Goal: *Absence of Falls  Description: Document Rebbecca Persons Fall Risk and appropriate interventions in the flowsheet.   9/24/2020 1335 by Sonia Jeans, RN  Outcome: Resolved/Met  9/24/2020 1043 by Sonia Jeans, RN  Outcome: Progressing Towards Goal  Note: Fall Risk Interventions:            Medication Interventions: Patient to call before getting OOB, Evaluate medications/consider consulting pharmacy, Teach patient to arise slowly

## 2020-09-25 ENCOUNTER — PATIENT OUTREACH (OUTPATIENT)
Dept: CASE MANAGEMENT | Age: 60
End: 2020-09-25

## 2020-09-25 NOTE — PROGRESS NOTES
Patient contacted regarding COVID-19  risk. Discussed COVID-19 related testing which was available at this time. Test results were positive. Patient informed of results, if available? yes     Care Transition Nurse contacted the patient by telephone to perform post discharge assessment. Verified name and  with patient as identifiers. Provided introduction to self, and explanation of the CTN/ACM/LPN role, and reason for call due to risk factors for infection and/or exposure to COVID-19. Symptoms reviewed with patient who verbalized the following symptoms: shortness of breath, diarrhea, no worsening symptoms and decrease PO. Due to no new or worsening symptoms encounter was not routed to provider for escalation. Discussed follow-up appointments. yes  [facility] follow up appointment(s):   Future Appointments   Date Time Provider Phil Burrows   2020 10:10 AM Jenny Zapata MD MEDICAL [facility] AMB     CTN informed pt.of [employer] [facility] [phone])  explain briefly type of service;  contact information provided. -1819 [facility] Planning:   Does patient have an Advance Directive: health care decision makers updated    Patient has following risk factors of: diabetes and HTN. CTN/ACM/LPN reviewed discharge instructions, medical action plan and red flags such as increased shortness of breath, increasing fever and signs of decompensation with patient who verbalized understanding. Discussed exposure protocols and quarantine with CDC Guidelines What to do if you are sick with coronavirus disease .  Patient was given an opportunity for questions and concerns. The patient agrees to contact the [facility] exposure line [phone], local [facility] department [facility]  ([phone]) and PCP office for questions related to their healthcare. CTN/ACM provided contact information for future needs.     Reviewed and educated patient on any new and changed medications related to discharge diagnosis. START taking these medications     Details   levoFLOXacin (LEVAQUIN) 750 mg tablet Take 1 Tab by mouth daily for 3 days. Qty: 3 Tab, Refills: 0       Patient/family/caregiver given information for [facility] and agrees to enroll no  Plan for follow-up call in 5-7 days based on severity of symptoms and risk factors.

## 2020-09-26 LAB
BACTERIA SPEC CULT: NORMAL
SERVICE CMNT-IMP: NORMAL

## 2020-09-29 ENCOUNTER — TELEPHONE (OUTPATIENT)
Dept: FAMILY MEDICINE CLINIC | Age: 60
End: 2020-09-29

## 2020-09-29 NOTE — TELEPHONE ENCOUNTER
----- Message from Rocco Calvert sent at 9/29/2020  2:39 PM EDT -----  Regarding: Dr. Lisa Moya Message/Vendor Calls    Caller's first and last name:Rakesh Echeverria      Reason for call:New Patient appointment      Callback required yes/no and why:yes      Best contact number(s):[phone] or [phone]      Details to clarify the request:patient did not receive a call for virtual visit at 10:10am      Rocco Calvert

## 2020-10-01 ENCOUNTER — VIRTUAL VISIT (OUTPATIENT)
Dept: FAMILY MEDICINE CLINIC | Age: 60
End: 2020-10-01
Payer: COMMERCIAL

## 2020-10-01 DIAGNOSIS — I10 HYPERTENSION GOAL BP (BLOOD PRESSURE) < 130/80: ICD-10-CM

## 2020-10-01 DIAGNOSIS — U07.1 COVID-19 VIRUS INFECTION: Primary | ICD-10-CM

## 2020-10-01 DIAGNOSIS — E78.5 DYSLIPIDEMIA (HIGH LDL; LOW HDL): ICD-10-CM

## 2020-10-01 DIAGNOSIS — E11.21 CONTROLLED TYPE 2 DIABETES MELLITUS WITH DIABETIC NEPHROPATHY, WITHOUT LONG-TERM CURRENT USE OF INSULIN (HCC): ICD-10-CM

## 2020-10-01 PROCEDURE — 99204 OFFICE O/P NEW MOD 45 MIN: CPT | Performed by: INTERNAL MEDICINE

## 2020-10-01 RX ORDER — METFORMIN HYDROCHLORIDE 1000 MG/1
1000 TABLET ORAL 2 TIMES DAILY WITH MEALS
Qty: 180 TAB | Refills: 1 | Status: SHIPPED | OUTPATIENT
Start: 2020-10-01 | End: 2020-10-28 | Stop reason: SDUPTHER

## 2020-10-01 RX ORDER — GLIPIZIDE 10 MG/1
10 TABLET, FILM COATED, EXTENDED RELEASE ORAL DAILY
Qty: 30 TAB | Refills: 3 | Status: SHIPPED | OUTPATIENT
Start: 2020-10-01 | End: 2020-10-28 | Stop reason: SDUPTHER

## 2020-10-01 RX ORDER — VALSARTAN AND HYDROCHLOROTHIAZIDE 320; 25 MG/1; MG/1
1 TABLET, FILM COATED ORAL DAILY
Qty: 90 TAB | Refills: 1 | Status: SHIPPED | OUTPATIENT
Start: 2020-10-01 | End: 2020-10-28 | Stop reason: SDUPTHER

## 2020-10-01 RX ORDER — AMLODIPINE BESYLATE 10 MG/1
TABLET ORAL
Qty: 90 TAB | Refills: 1 | Status: SHIPPED | OUTPATIENT
Start: 2020-10-01 | End: 2020-10-28 | Stop reason: SDUPTHER

## 2020-10-01 RX ORDER — ATORVASTATIN CALCIUM 10 MG/1
10 TABLET, FILM COATED ORAL DAILY
Qty: 30 TAB | Refills: 3 | Status: SHIPPED | OUTPATIENT
Start: 2020-10-01 | End: 2020-10-28 | Stop reason: SDUPTHER

## 2020-10-01 NOTE — PROGRESS NOTES
Chief Complaint   Patient presents with   [facility] Patient     HPI:  Ronen Person is a 64 y.o. male who was seen by synchronous (real-time) audio-video technology on 10/1/2020 for New Patient    Assessment & Plan:   Diagnoses and all orders for this visit:    1. COVID-19 virus infection    2. Controlled type 2 diabetes mellitus with diabetic nephropathy, without long-term current use of insulin (HCC)  -     metFORMIN (GLUCOPHAGE) 1,000 mg tablet; Take 1 Tab by mouth two (2) times daily (with meals). -     glipiZIDE SR (GLUCOTROL XL) 10 mg CR tablet; Take 1 Tab by mouth daily. 3. Hypertension goal BP (blood pressure) < 130/80  -     amLODIPine (NORVASC) 10 mg tablet; TAKE 1 TABLET BY MOUTH ONCE DAILY  -     valsartan-hydroCHLOROthiazide (DIOVAN-HCT) 320-25 mg per tablet; Take 1 Tab by mouth daily. 4. Dyslipidemia (high LDL; low HDL)  -     atorvastatin (LIPITOR) 10 mg tablet; Take 1 Tab by mouth daily. I spent at least 20 minutes on this visit with this established patient. 712  Subjective:   Ronen Person is a 64 y.o. AA male with h/o hypertension, diabetes, hyperlipidemia who was recently diagnosed of COVID on 9/21/20 is seen as a new patient. He still c/o +cough  And some shortness of breath. Patient is requesting medication refill. Advised him to repeat COVID test next Monday. Prior to Admission medications    Medication Sig Start Date End Date Taking? Authorizing Provider   amLODIPine (NORVASC) 10 mg tablet TAKE 1 TABLET BY MOUTH ONCE DAILY 9/27/20  Yes Lauri ROSADO MD   metFORMIN (GLUCOPHAGE) 1,000 mg tablet Take 1,000 mg by mouth two (2) times daily (with meals). Yes Provider, Historical   atorvastatin (LIPITOR) 10 mg tablet Take 10 mg by mouth daily. Yes Provider, Historical   glipiZIDE SR (GLUCOTROL XL) 5 mg CR tablet Take 5 mg by mouth daily. Yes Provider, Historical   valsartan-hydroCHLOROthiazide (DIOVAN-HCT) 320-25 mg per tablet Take 1 Tab by mouth daily.    Yes Provider, Historical     Patient Active Problem List   Diagnosis Code    Acute respiratory failure with hypoxia (HCC) J96.01    COVID-19 virus infection U07.1     Current Outpatient Medications   Medication Sig Dispense Refill    amLODIPine (NORVASC) 10 mg tablet TAKE 1 TABLET BY MOUTH ONCE DAILY 30 Tab 0    metFORMIN (GLUCOPHAGE) 1,000 mg tablet Take 1,000 mg by mouth two (2) times daily (with meals).  atorvastatin (LIPITOR) 10 mg tablet Take 10 mg by mouth daily.  glipiZIDE SR (GLUCOTROL XL) 5 mg CR tablet Take 5 mg by mouth daily.  valsartan-hydroCHLOROthiazide (DIOVAN-HCT) 320-25 mg per tablet Take 1 Tab by mouth daily. No Known Allergies  Past Medical History:   Diagnosis Date    Diabetes ([facility] [address]     Hyperlipidemia     borderline    Hypertension      No past surgical history on file. Family History   Problem Relation Age of Onset    Hypertension Mother     No Known Problems Father      Social History     Tobacco Use    Smoking status: Current Some Day Smoker    Smokeless tobacco: Never Used    Tobacco comment: 1 cigar a year   Substance Use Topics    Alcohol use: Yes     Frequency: 2-3 times a week     Comment: drink only on weekends 2-3 drinks     ROS  Pertinent items are noted in hip    Objective:   No flowsheet data found. General: alert, cooperative, no distress   Mental  status: normal mood, behavior, speech, dress, motor activity, and thought processes, able to follow commands   HENT: NCAT   Neck: no visualized mass   Resp: no respiratory distress   Neuro: no gross deficits   Skin: no discoloration or lesions of concern on visible areas     Additional exam findings: We discussed the expected course, resolution and complications of the diagnosis(es) in detail. Medication risks, benefits, costs, interactions, and alternatives were discussed as indicated. I advised him to contact the office if his condition worsens, changes or fails to improve as anticipated.  He expressed understanding with the diagnosis(es) and plan. Leno Levi, who was evaluated through a patient-initiated, synchronous (real-time) audio-video encounter, and/or his healthcare decision maker, is aware that it is a billable service, with coverage as determined by his insurance carrier. He provided verbal consent to proceed: Yes, and patient identification was verified. It was conducted pursuant to the emergency declaration under the [address] [place] [address] and the [employer] General Act. A caregiver was present when appropriate. Ability to conduct physical exam was limited. I was in the office. The patient was at home.       Marcella Breen MD

## 2020-10-05 ENCOUNTER — OFFICE VISIT (OUTPATIENT)
Dept: URGENT CARE | Age: 60
End: 2020-10-05
Payer: COMMERCIAL

## 2020-10-05 VITALS — TEMPERATURE: 98.6 F | HEART RATE: 78 BPM | OXYGEN SATURATION: 95 % | RESPIRATION RATE: 15 BRPM

## 2020-10-05 DIAGNOSIS — U07.1 COVID-19: Primary | ICD-10-CM

## 2020-10-05 PROCEDURE — 99202 OFFICE O/P NEW SF 15 MIN: CPT | Performed by: NURSE PRACTITIONER

## 2020-10-05 NOTE — PROGRESS NOTES
Subjective: (As above and below)       This patient was seen in [facility] at [address] [place] [address] [facility]gent [facility] while in their vehicle due to COVID-19 pandemic with PPE and focused examination in order to decrease community viral transmission. The patient/guardian gave verbal consent to treat. Chief Complaint   Patient presents with    Covid Testing     previous positive covid test, dry cough     Nathan Sandoval is a 64 y.o. male who presents for COVID-19 testing  Previously positive on 09/21/20202 (2 weeks ago) with hospitalization. Promotes his doctor requires re testing. He has made gradual improvement without any worsening. Still some infrequent residual cough (improving) without any SOB or fevers. Currently has not tried any therapies and denies any symptoms at this point in time. Feels well otherwise      ROS- negative for dizziness, cough, SOB, rhinorrhea, myalgias, n/v/d, rashes, headaches, fevers, chills, chest pains. Review of Systems - negative except as listed above    Reviewed PmHx, RxHx, FmHx, SocHx, AllgHx and updated in chart.   Family History   Problem Relation Age of Onset    Hypertension Mother     No Known Problems Father        Past Medical History:   Diagnosis Date    Diabetes ([facility] [address]     Hyperlipidemia     borderline    Hypertension       Social History     Socioeconomic History    Marital status:      Spouse name: Not on file    Number of children: Not on file    Years of education: Not on file    Highest education level: Not on file   Occupational History    Occupation: machinery maintenance   Tobacco Use    Smoking status: Current Some Day Smoker    Smokeless tobacco: Never Used    Tobacco comment: 1 cigar a year   Substance and Sexual Activity    Alcohol use: Yes     Frequency: 2-3 times a week     Comment: drink only on weekends 2-3 drinks    Drug use: Never          Current Outpatient Medications   Medication Sig    amLODIPine (NORVASC) 10 mg tablet TAKE 1 TABLET BY MOUTH ONCE DAILY    metFORMIN (GLUCOPHAGE) 1,000 mg tablet Take 1 Tab by mouth two (2) times daily (with meals).  valsartan-hydroCHLOROthiazide (DIOVAN-HCT) 320-25 mg per tablet Take 1 Tab by mouth daily.  glipiZIDE SR (GLUCOTROL XL) 10 mg CR tablet Take 1 Tab by mouth daily.  atorvastatin (LIPITOR) 10 mg tablet Take 1 Tab by mouth daily. No current facility-administered medications for this visit. Objective:     Vitals:    10/05/20 1043   Pulse: 78   Resp: 15   Temp: 98.6 °F (37 °C)   SpO2: 95%       Physical Exam  General appearance  appears well hydrated and does not appear toxic, no acute distress  Eyes - EOMs intact. Non injected. No scleral icterus   Ears - no external swelling  Nose - No purulent drainage  Neck/Lymphatics  trachea midline, full AROM  Chest - normal breathing effort. No active cough heard. No wheeze rales or rhonchi bilat  Heart - RRR no murmurs  Skin - no observable rashes or pallor  Neurologic- alert and oriented x 3  Psychiatric- normal mood, behavior and though content. Assessment/ Plan:     1. COVID-19    - NOVEL CORONAVIRUS (COVID-19)    Improving after being previously positive. Stable VS and has clear lungs on exam.  Will retest based on physician recommendation/referral for re testing. Follow up: We have reviewed, in detail, particular presentations/worsening/concerning signs and symptoms that may warrant seeking immediate care in the ED setting and patient verbalized being aware of what to watch for. For other non-severe changes, non-improvement or questions, patient aware to contact PCP office or consider a virtual online medical consultation. Reviewed with him that COVID-19 pandemic is an evolving situation with rapidly changing recommendations & guidelines. Medical decisions are made based on the the best information available at the time.   Recommended he stay tuned for updates published by trusted sources and to advise your PCP of any unexpected changes in clinical condition     Jimbo Santos NP

## 2020-10-07 ENCOUNTER — PATIENT OUTREACH (OUTPATIENT)
Dept: CASE MANAGEMENT | Age: 60
End: 2020-10-07

## 2020-10-07 LAB — SARS-COV-2, NAA: NOT DETECTED

## 2020-10-07 NOTE — PROGRESS NOTES
Patient resolved from Transition of Care episode on 10/7/20  Discussed COVID-19 related testing which was available at this time. Test results were negative. Patient informed of results, if available? yes     Patient/family has been provided the following resources and education related to COVID-19:                         Signs, symptoms and red flags related to COVID-19            CDC exposure and quarantine guidelines            [employer] exposure contact - [phone]            Contact for their local Department of Health                 Patient currently reports that the following symptoms have improved:  cough. No further outreach scheduled with this CTN. Episode of Care resolved. Patient has this CTN contact information if future needs arise.

## 2020-10-11 ENCOUNTER — DOCUMENTATION ONLY (OUTPATIENT)
Dept: FAMILY MEDICINE CLINIC | Age: 60
End: 2020-10-11

## 2020-10-28 ENCOUNTER — OFFICE VISIT (OUTPATIENT)
Dept: FAMILY MEDICINE CLINIC | Age: 60
End: 2020-10-28
Payer: COMMERCIAL

## 2020-10-28 VITALS
TEMPERATURE: 96.9 F | HEART RATE: 86 BPM | OXYGEN SATURATION: 99 % | WEIGHT: 236 LBS | RESPIRATION RATE: 20 BRPM | DIASTOLIC BLOOD PRESSURE: 80 MMHG | SYSTOLIC BLOOD PRESSURE: 130 MMHG | HEIGHT: 70 IN | BODY MASS INDEX: 33.79 KG/M2

## 2020-10-28 DIAGNOSIS — Z12.5 SCREENING FOR PROSTATE CANCER: ICD-10-CM

## 2020-10-28 DIAGNOSIS — E55.9 VITAMIN D DEFICIENCY: ICD-10-CM

## 2020-10-28 DIAGNOSIS — E11.9 ENCOUNTER FOR DIABETIC FOOT EXAM (HCC): ICD-10-CM

## 2020-10-28 DIAGNOSIS — Z00.00 ENCOUNTER FOR ANNUAL PHYSICAL EXAM: Primary | ICD-10-CM

## 2020-10-28 DIAGNOSIS — I10 HYPERTENSION GOAL BP (BLOOD PRESSURE) < 130/80: ICD-10-CM

## 2020-10-28 DIAGNOSIS — Z01.00 DIABETIC EYE EXAM (HCC): ICD-10-CM

## 2020-10-28 DIAGNOSIS — E11.9 DIABETIC EYE EXAM (HCC): ICD-10-CM

## 2020-10-28 DIAGNOSIS — E78.5 DYSLIPIDEMIA (HIGH LDL; LOW HDL): ICD-10-CM

## 2020-10-28 DIAGNOSIS — R35.0 FREQUENCY OF URINATION: ICD-10-CM

## 2020-10-28 DIAGNOSIS — I10 HYPERTENSION, WELL CONTROLLED: ICD-10-CM

## 2020-10-28 DIAGNOSIS — Z13.29 SCREENING FOR THYROID DISORDER: ICD-10-CM

## 2020-10-28 DIAGNOSIS — E11.21 CONTROLLED TYPE 2 DIABETES MELLITUS WITH DIABETIC NEPHROPATHY, WITHOUT LONG-TERM CURRENT USE OF INSULIN (HCC): ICD-10-CM

## 2020-10-28 DIAGNOSIS — Z11.59 NEED FOR HEPATITIS C SCREENING TEST: ICD-10-CM

## 2020-10-28 PROCEDURE — 99396 PREV VISIT EST AGE 40-64: CPT | Performed by: INTERNAL MEDICINE

## 2020-10-28 RX ORDER — GLIPIZIDE 10 MG/1
10 TABLET, FILM COATED, EXTENDED RELEASE ORAL DAILY
Qty: 30 TAB | Refills: 3 | Status: SHIPPED | OUTPATIENT
Start: 2020-10-28

## 2020-10-28 RX ORDER — VALSARTAN AND HYDROCHLOROTHIAZIDE 320; 25 MG/1; MG/1
1 TABLET, FILM COATED ORAL DAILY
Qty: 90 TAB | Refills: 1 | Status: SHIPPED | OUTPATIENT
Start: 2020-10-28

## 2020-10-28 RX ORDER — METFORMIN HYDROCHLORIDE 1000 MG/1
1000 TABLET ORAL 2 TIMES DAILY WITH MEALS
Qty: 180 TAB | Refills: 1 | Status: SHIPPED | OUTPATIENT
Start: 2020-10-28

## 2020-10-28 RX ORDER — AMLODIPINE BESYLATE 10 MG/1
TABLET ORAL
Qty: 90 TAB | Refills: 1 | Status: SHIPPED | OUTPATIENT
Start: 2020-10-28

## 2020-10-28 RX ORDER — ATORVASTATIN CALCIUM 10 MG/1
10 TABLET, FILM COATED ORAL DAILY
Qty: 30 TAB | Refills: 3 | Status: SHIPPED | OUTPATIENT
Start: 2020-10-28

## 2020-10-28 NOTE — PROGRESS NOTES
Chief Complaint   Patient presents with    Follow-up     4 WEEK  /       HPI:  Kyler Russo is a 64 y.o. AA male with h/o hypertension, hypercholesterolemia, diabetes type 2 presents for 4 week follow-up after first virtual visit. Patient was diagnosed of COVID-19 infection at the time when he establish care. He tested negative 10/5/20. Denies fever/chills, cough, sob, blood pressure is at goal.   Last A1C of 11% indicate poorly controlled diabetes on metformin 1g twice a day and glipizide ER 10 mg. Patient has no complaints today. Patient is due for annual physical and some HM procedures    Review of Systems  As per hpi    Past Medical History:   Diagnosis Date    Diabetes ([facility] [address]     Hyperlipidemia     borderline    Hypertension      History reviewed. No pertinent surgical history. Social History     Socioeconomic History    Marital status:      Spouse name: Not on file    Number of children: Not on file    Years of education: Not on file    Highest education level: Not on file   Occupational History    Occupation: machinery maintenance   Tobacco Use    Smoking status: Current Some Day Smoker    Smokeless tobacco: Never Used    Tobacco comment: 1 cigar a year   Substance and Sexual Activity    Alcohol use: Yes     Frequency: 2-3 times a week     Comment: drink only on weekends 2-3 drinks    Drug use: Never     Family History   Problem Relation Age of Onset    Hypertension Mother     No Known Problems Father      Current Outpatient Medications   Medication Sig Dispense Refill    amLODIPine (NORVASC) 10 mg tablet TAKE 1 TABLET BY MOUTH ONCE DAILY 90 Tab 1    metFORMIN (GLUCOPHAGE) 1,000 mg tablet Take 1 Tab by mouth two (2) times daily (with meals). 180 Tab 1    valsartan-hydroCHLOROthiazide (DIOVAN-HCT) 320-25 mg per tablet Take 1 Tab by mouth daily. 90 Tab 1    glipiZIDE SR (GLUCOTROL XL) 10 mg CR tablet Take 1 Tab by mouth daily.  30 Tab 3    atorvastatin (LIPITOR) 10 mg tablet Take 1 Tab by mouth daily. 30 Tab 3     No Known Allergies    Objective:  Visit Vitals  /80   Pulse 86   Temp 96.9 °F (36.1 °C) (Temporal)   Resp 20   Ht 5' 10\" (1.778 m)   Wt 236 lb (107 kg)   SpO2 99%   BMI 33.86 kg/m²     Physical Exam:   General appearance - alert, well appearing in no distress  Mental status - alert, oriented to person, place, and time  EYE-PERRL, EOMI  ENT-ENT exam normal, no neck nodes or sinus tenderness  Neck - supple, no significant adenopathy   Chest - clear to auscultation, no wheezes, rales or rhonchi  Heart - normal rate, regular rhythm, no murmurs  Abdomen - soft, nontender, nondistended, no organomegaly  Foot-peripheral pulses normal, no pedal edema  Skin-Warm and dry. no hyperpigmentation, vitiligo, or suspicious lesions  Neuro -no focal findings   Back-full range of motion, no tenderness, palpable spasm or pain on motion     Results for orders placed or performed in visit on 10/05/20   NOVEL CORONAVIRUS (COVID-19)   Result Value Ref Range    SARS-CoV-2, TARI Not Detected Not Detected     Assessment/Plan:  Diagnoses and all orders for this visit:    Encounter for annual physical exam  -     METABOLIC PANEL, COMPREHENSIVE  -     CBC W/O DIFF    Controlled type 2 diabetes mellitus with diabetic nephropathy, without long-term current use of insulin (HCC)  -     METABOLIC PANEL, COMPREHENSIVE  -     HEMOGLOBIN A1C WITH EAG  -     MICROALBUMIN, UR, RAND W/ MICROALB/CREAT RATIO  -     metFORMIN (GLUCOPHAGE) 1,000 mg tablet; Take 1 Tab by mouth two (2) times daily (with meals). , Normal, Disp-180 Tab,R-1  -     glipiZIDE SR (GLUCOTROL XL) 10 mg CR tablet; Take 1 Tab by mouth daily. , Normal, Disp-30 Tab,R-3    Dyslipidemia (high LDL; low HDL)  -     LIPID PANEL  -     atorvastatin (LIPITOR) 10 mg tablet; Take 1 Tab by mouth daily. , Normal, Disp-30 Tab,R-3    Hypertension, well controlled  -     METABOLIC PANEL, COMPREHENSIVE    Encounter for diabetic foot exam ([facility]  -      DIABETES FOOT EXAM  -      DIABETES FOOT EXAM    Screening for thyroid disorder  -     TSH 3RD GENERATION    Need for hepatitis C screening test  -     HEPATITIS C AB    Vitamin D deficiency  -     VITAMIN D, 25 HYDROXY    Screening for prostate cancer  -     PSA, DIAGNOSTIC (PROSTATE SPECIFIC AG)    Frequency of urination  -     URINALYSIS W/ RFLX MICROSCOPIC    Diabetic eye exam (HCC)  -     REFERRAL TO OPHTHALMOLOGY    Hypertension goal BP (blood pressure) < 130/80  -     amLODIPine (NORVASC) 10 mg tablet; TAKE 1 TABLET BY MOUTH ONCE DAILY, Normal, Disp-90 Tab,R-1  -     valsartan-hydroCHLOROthiazide (DIOVAN-HCT) 320-25 mg per tablet; Take 1 Tab by mouth daily. , Normal, Disp-90 Tab,R-1      Patient Instructions        Low Sodium Diet (2,000 Milligram): Care Instructions  Your Care Instructions     Too much sodium causes your body to hold on to extra water. This can raise your blood pressure and force your heart and kidneys to work harder. In very serious cases, this could cause you to be put in the hospital. It might even be life-threatening. By limiting sodium, you will feel better and lower your risk of serious problems. The most common source of sodium is salt. People get most of the salt in their diet from canned, prepared, and packaged foods. Fast food and restaurant meals also are very high in sodium. Your doctor will probably limit your sodium to less than 2,000 milligrams (mg) a day. This limit counts all the sodium in prepared and packaged foods and any salt you add to your food. Follow-up care is a key part of your treatment and safety. Be sure to make and go to all appointments, and call your doctor if you are having problems. It's also a good idea to know your test results and keep a list of the medicines you take. How can you care for yourself at home? Read food labels  · Read labels on cans and food packages. The labels tell you how much sodium is in each serving.  Make sure that you look at the serving size. If you eat more than the serving size, you have eaten more sodium. · Food labels also tell you the Percent Daily Value for sodium. Choose products with low Percent Daily Values for sodium. · Be aware that sodium can come in forms other than salt, including monosodium glutamate (MSG), sodium citrate, and sodium bicarbonate (baking soda). MSG is often added to Asian food. When you eat out, you can sometimes ask for food without MSG or added salt. Buy low-sodium foods  · Buy foods that are labeled \"unsalted\" (no salt added), \"sodium-free\" (less than 5 mg of sodium per serving), or \"low-sodium\" (less than 140 mg of sodium per serving). Foods labeled \"reduced-sodium\" and \"light sodium\" may still have too much sodium. Be sure to read the label to see how much sodium you are getting. · Buy fresh vegetables, or frozen vegetables without added sauces. Buy low-sodium versions of canned vegetables, soups, and other canned goods. Prepare low-sodium meals  · Cut back on the amount of salt you use in cooking. This will help you adjust to the taste. Do not add salt after cooking. One teaspoon of salt has about 2,300 mg of sodium. · Take the salt shaker off the table. · Flavor your food with garlic, lemon juice, onion, vinegar, herbs, and spices. Do not use soy sauce, lite soy sauce, steak sauce, onion salt, garlic salt, celery salt, mustard, or ketchup on your food. · Use low-sodium salad dressings, sauces, and ketchup. Or make your own salad dressings and sauces without adding salt. · Use less salt (or none) when recipes call for it. You can often use half the salt a recipe calls for without losing flavor. Other foods such as rice, pasta, and grains do not need added salt. · Rinse canned vegetables, and cook them in fresh water. This removes somebut not allof the salt. · Avoid water that is naturally high in sodium or that has been treated with water softeners, which add sodium.  Call your local water company to find out the sodium content of your water supply. If you buy bottled water, read the label and choose a sodium-free brand. Avoid high-sodium foods  · Avoid eating:  ? Smoked, cured, salted, and canned meat, fish, and poultry. ? Ham, camarena, hot dogs, and luncheon meats. ? Regular, hard, and processed cheese and regular peanut butter. ? Crackers with salted tops, and other salted snack foods such as pretzels, chips, and salted popcorn. ? Frozen prepared meals, unless labeled low-sodium. ? Canned and dried soups, broths, and bouillon, unless labeled sodium-free or low-sodium. ? Canned vegetables, unless labeled sodium-free or low-sodium. ? Western Ping fries, pizza, tacos, and other fast foods. ? Pickles, olives, ketchup, and other condiments, especially soy sauce, unless labeled sodium-free or low-sodium. Where can you learn more? Go to http://www.gray.com/  Enter V843 in the search box to learn more about \"Low Sodium Diet (2,000 Milligram): Care Instructions. \"  Current as of: August 22, 2019               Content Version: 12.6  © 6119-3019 [employer]. Care instructions adapted under license by [employer] (which disclaims liability or warranty for this information). If you have questions about a medical condition or this instruction, always ask your healthcare professional. [place] [address] any warranty or liability for your use of this information. Follow-up and Dispositions    · Return in about 4 weeks (around 11/25/2020), or if symptoms worsen or fail to improve, for follow up BP and results.

## 2020-10-28 NOTE — PATIENT INSTRUCTIONS
Low Sodium Diet (2,000 Milligram): Care Instructions Your Care Instructions Too much sodium causes your body to hold on to extra water. This can raise your blood pressure and force your heart and kidneys to work harder. In very serious cases, this could cause you to be put in the hospital. It might even be life-threatening. By limiting sodium, you will feel better and lower your risk of serious problems. The most common source of sodium is salt. People get most of the salt in their diet from canned, prepared, and packaged foods. Fast food and restaurant meals also are very high in sodium. Your doctor will probably limit your sodium to less than 2,000 milligrams (mg) a day. This limit counts all the sodium in prepared and packaged foods and any salt you add to your food. Follow-up care is a key part of your treatment and safety. Be sure to make and go to all appointments, and call your doctor if you are having problems. It's also a good idea to know your test results and keep a list of the medicines you take. How can you care for yourself at home? Read food labels · Read labels on cans and food packages. The labels tell you how much sodium is in each serving. Make sure that you look at the serving size. If you eat more than the serving size, you have eaten more sodium. · Food labels also tell you the Percent Daily Value for sodium. Choose products with low Percent Daily Values for sodium. · Be aware that sodium can come in forms other than salt, including monosodium glutamate (MSG), sodium citrate, and sodium bicarbonate (baking soda). MSG is often added to Asian food. When you eat out, you can sometimes ask for food without MSG or added salt. Buy low-sodium foods · Buy foods that are labeled \"unsalted\" (no salt added), \"sodium-free\" (less than 5 mg of sodium per serving), or \"low-sodium\" (less than 140 mg of sodium per serving).  Foods labeled \"reduced-sodium\" and \"light sodium\" may still have too much sodium. Be sure to read the label to see how much sodium you are getting. · Buy fresh vegetables, or frozen vegetables without added sauces. Buy low-sodium versions of canned vegetables, soups, and other canned goods. Prepare low-sodium meals · Cut back on the amount of salt you use in cooking. This will help you adjust to the taste. Do not add salt after cooking. One teaspoon of salt has about 2,300 mg of sodium. · Take the salt shaker off the table. · Flavor your food with garlic, lemon juice, onion, vinegar, herbs, and spices. Do not use soy sauce, lite soy sauce, steak sauce, onion salt, garlic salt, celery salt, mustard, or ketchup on your food. · Use low-sodium salad dressings, sauces, and ketchup. Or make your own salad dressings and sauces without adding salt. · Use less salt (or none) when recipes call for it. You can often use half the salt a recipe calls for without losing flavor. Other foods such as rice, pasta, and grains do not need added salt. · Rinse canned vegetables, and cook them in fresh water. This removes somebut not allof the salt. · Avoid water that is naturally high in sodium or that has been treated with water softeners, which add sodium. Call your local water company to find out the sodium content of your water supply. If you buy bottled water, read the label and choose a sodium-free brand. Avoid high-sodium foods · Avoid eating: 
? Smoked, cured, salted, and canned meat, fish, and poultry. ? Ham, camarena, hot dogs, and luncheon meats. ? Regular, hard, and processed cheese and regular peanut butter. ? Crackers with salted tops, and other salted snack foods such as pretzels, chips, and salted popcorn. ? Frozen prepared meals, unless labeled low-sodium. ? Canned and dried soups, broths, and bouillon, unless labeled sodium-free or low-sodium. ? Canned vegetables, unless labeled sodium-free or low-sodium. ? Western [employer]ing fries, pizza, tacos, and other fast foods. ? Pickles, olives, ketchup, and other condiments, especially soy sauce, unless labeled sodium-free or low-sodium. Where can you learn more? Go to http://www.gray.com/ Enter U574 in the search box to learn more about \"Low Sodium Diet (2,000 Milligram): Care Instructions. \" Current as of: August 22, 2019               Content Version: 12.6 © 9950-6887 [employer]. Care instructions adapted under license by [employer] (which disclaims liability or warranty for this information). If you have questions about a medical condition or this instruction, always ask your healthcare professional. [address] any warranty or liability for your use of this information.

## 2020-10-29 LAB
25(OH)D3+25(OH)D2 SERPL-MCNC: 9.1 NG/ML (ref 30–100)
ALBUMIN SERPL-MCNC: 4.1 G/DL (ref 3.8–4.9)
ALBUMIN/CREAT UR: 11 MG/G CREAT (ref 0–29)
ALBUMIN/GLOB SERPL: 1.2 {RATIO} (ref 1.2–2.2)
ALP SERPL-CCNC: 78 IU/L (ref 39–117)
ALT SERPL-CCNC: 11 IU/L (ref 0–44)
APPEARANCE UR: CLEAR
AST SERPL-CCNC: 13 IU/L (ref 0–40)
BILIRUB SERPL-MCNC: 0.3 MG/DL (ref 0–1.2)
BILIRUB UR QL STRIP: NEGATIVE
BUN SERPL-MCNC: 11 MG/DL (ref 6–24)
BUN/CREAT SERPL: 14 (ref 9–20)
CALCIUM SERPL-MCNC: 9.9 MG/DL (ref 8.7–10.2)
CHLORIDE SERPL-SCNC: 97 MMOL/L (ref 96–106)
CHOLEST SERPL-MCNC: 107 MG/DL (ref 100–199)
CO2 SERPL-SCNC: 25 MMOL/L (ref 20–29)
COLOR UR: YELLOW
CREAT SERPL-MCNC: 0.78 MG/DL (ref 0.76–1.27)
CREAT UR-MCNC: 100.1 MG/DL
ERYTHROCYTE [DISTWIDTH] IN BLOOD BY AUTOMATED COUNT: 16.7 % (ref 11.6–15.4)
EST. AVERAGE GLUCOSE BLD GHB EST-MCNC: 278 MG/DL
GLOBULIN SER CALC-MCNC: 3.4 G/DL (ref 1.5–4.5)
GLUCOSE SERPL-MCNC: 230 MG/DL (ref 65–99)
GLUCOSE UR QL: NEGATIVE
HBA1C MFR BLD: 11.3 % (ref 4.8–5.6)
HCT VFR BLD AUTO: 39.8 % (ref 37.5–51)
HCV AB S/CO SERPL IA: <0.1 S/CO RATIO (ref 0–0.9)
HDLC SERPL-MCNC: 42 MG/DL
HGB BLD-MCNC: 12.4 G/DL (ref 13–17.7)
HGB UR QL STRIP: NEGATIVE
KETONES UR QL STRIP: NEGATIVE
LDLC SERPL CALC-MCNC: 46 MG/DL (ref 0–99)
LEUKOCYTE ESTERASE UR QL STRIP: NEGATIVE
MCH RBC QN AUTO: 23.5 PG (ref 26.6–33)
MCHC RBC AUTO-ENTMCNC: 31.2 G/DL (ref 31.5–35.7)
MCV RBC AUTO: 76 FL (ref 79–97)
MICRO URNS: NORMAL
MICROALBUMIN UR-MCNC: 11.5 UG/ML
NITRITE UR QL STRIP: NEGATIVE
PH UR STRIP: 5.5 [PH] (ref 5–7.5)
PLATELET # BLD AUTO: 318 X10E3/UL (ref 150–450)
POTASSIUM SERPL-SCNC: 4.4 MMOL/L (ref 3.5–5.2)
PROT SERPL-MCNC: 7.5 G/DL (ref 6–8.5)
PROT UR QL STRIP: NEGATIVE
PSA SERPL-MCNC: 0.5 NG/ML (ref 0–4)
RBC # BLD AUTO: 5.27 X10E6/UL (ref 4.14–5.8)
SODIUM SERPL-SCNC: 138 MMOL/L (ref 134–144)
SP GR UR: 1.02 (ref 1–1.03)
TRIGL SERPL-MCNC: 98 MG/DL (ref 0–149)
TSH SERPL DL<=0.005 MIU/L-ACNC: 2.09 UIU/ML (ref 0.45–4.5)
UROBILINOGEN UR STRIP-MCNC: 0.2 MG/DL (ref 0.2–1)
VLDLC SERPL CALC-MCNC: 19 MG/DL (ref 5–40)
WBC # BLD AUTO: 8.5 X10E3/UL (ref 3.4–10.8)

## 2020-10-30 DIAGNOSIS — E55.9 VITAMIN D DEFICIENCY: Primary | ICD-10-CM

## 2020-10-30 RX ORDER — CHOLECALCIFEROL (VITAMIN D3) 125 MCG
5000 CAPSULE ORAL DAILY
Qty: 60 CAP | Refills: 2 | Status: SHIPPED | OUTPATIENT
Start: 2020-10-30

## 2020-12-02 ENCOUNTER — TELEPHONE (OUTPATIENT)
Dept: FAMILY MEDICINE CLINIC | Age: 60
End: 2020-12-02

## 2022-03-18 PROBLEM — J96.01 ACUTE RESPIRATORY FAILURE WITH HYPOXIA (HCC): Status: ACTIVE | Noted: 2020-09-21

## 2022-03-18 PROBLEM — U07.1 COVID-19 VIRUS INFECTION: Status: ACTIVE | Noted: 2020-09-21

## 2023-05-23 RX ORDER — ATORVASTATIN CALCIUM 10 MG/1
10 TABLET, FILM COATED ORAL DAILY
COMMUNITY
Start: 2020-10-28

## 2023-05-23 RX ORDER — AMLODIPINE BESYLATE 10 MG/1
1 TABLET ORAL DAILY
COMMUNITY
Start: 2020-10-28

## 2023-05-23 RX ORDER — VALSARTAN AND HYDROCHLOROTHIAZIDE 320; 25 MG/1; MG/1
1 TABLET, FILM COATED ORAL DAILY
COMMUNITY
Start: 2020-10-28

## 2023-05-23 RX ORDER — GLIPIZIDE 10 MG/1
10 TABLET, FILM COATED, EXTENDED RELEASE ORAL DAILY
COMMUNITY
Start: 2020-10-28